# Patient Record
Sex: FEMALE | Race: WHITE | NOT HISPANIC OR LATINO | Employment: FULL TIME | URBAN - METROPOLITAN AREA
[De-identification: names, ages, dates, MRNs, and addresses within clinical notes are randomized per-mention and may not be internally consistent; named-entity substitution may affect disease eponyms.]

---

## 2017-01-04 ENCOUNTER — OFFICE VISIT (OUTPATIENT)
Dept: OBGYN | Facility: OTHER | Age: 52
End: 2017-01-04
Payer: COMMERCIAL

## 2017-01-04 VITALS
BODY MASS INDEX: 21.35 KG/M2 | DIASTOLIC BLOOD PRESSURE: 58 MMHG | SYSTOLIC BLOOD PRESSURE: 102 MMHG | HEART RATE: 64 BPM | WEIGHT: 125.5 LBS

## 2017-01-04 DIAGNOSIS — N89.8 VAGINAL ITCHING: Primary | ICD-10-CM

## 2017-01-04 LAB
MICRO REPORT STATUS: ABNORMAL
SPECIMEN SOURCE: ABNORMAL
WET PREP SPEC: ABNORMAL

## 2017-01-04 PROCEDURE — 87210 SMEAR WET MOUNT SALINE/INK: CPT | Performed by: ADVANCED PRACTICE MIDWIFE

## 2017-01-04 PROCEDURE — 99213 OFFICE O/P EST LOW 20 MIN: CPT | Performed by: ADVANCED PRACTICE MIDWIFE

## 2017-01-04 RX ORDER — FLUCONAZOLE 150 MG/1
150 TABLET ORAL
Qty: 3 TABLET | Refills: 1 | Status: SHIPPED | OUTPATIENT
Start: 2017-01-04 | End: 2017-11-14

## 2017-01-04 NOTE — NURSING NOTE
"Chief Complaint   Patient presents with     Vaginal Problem     check for yeast infection       Initial /58 mmHg  Pulse 64  Wt 125 lb 8 oz (56.926 kg)  LMP 12/24/2016 (Approximate) Estimated body mass index is 21.35 kg/(m^2) as calculated from the following:    Height as of 8/9/16: 5' 4.29\" (1.633 m).    Weight as of this encounter: 125 lb 8 oz (56.926 kg).  BP completed using cuff size: perla Chong CMA      "

## 2017-01-04 NOTE — PROGRESS NOTES
Brittany Corbett is a 51 year old who presents to the clinic for evaluation of vaginal changes      Vaginal Symptoms      Onset 1 week number of episodes of vaginitis in the past year 2    Description  itching    Intensity:  moderate    Accompanying signs and symptoms (fever/dysuria/abdominal or back pain): None    History  Sexually active: yes, single partner,   Currently pregnant: no  Possibility of pregnancy: No  Recent antibiotic use: no  Diabetes: no  Precipitating or alleviating factors: perhaps her period    Therapies tried and outcome: Monistat   Outcome: staying the same         Histories reviewed and updated  Past Medical History   Diagnosis Date     Psoriasis 9/23/2009     Past Surgical History   Procedure Laterality Date     C nonspecific procedure       laparoscopy for infertility     Hc tooth extraction w/forcep       Colonoscopy  7/21/2014     Procedure: COLONOSCOPY;  Surgeon: Duane, William Charles, MD;  Location: [facility] OR     Biopsy  June 2014     breast biopsy, right     Colonoscopy       Head & neck surgery       Social History     Social History     Marital Status:      Spouse Name: N/A     Number of Children: N/A     Years of Education: N/A     Occupational History     Not on file.     Social History Main Topics     Smoking status: Never Smoker      Smokeless tobacco: Never Used      Comment: no smokers in household     Alcohol Use: Yes      Comment: alcoholic beveragesx2/1x per month     Drug Use: No     Sexual Activity:     Partners: Male     Birth Control/ Protection: Surgical      Comment: vasectomy/infertility issues     Other Topics Concern     Parent/Sibling W/ Cabg, Mi Or Angioplasty Before 65f 55m? Yes     father at age 55 triple by pass     Social History Narrative     Family History   Problem Relation Age of Onset     DIABETES Mother      CANCER Mother      multiple myeloma     Hypertension Mother      C.A.D. Father      age 55 for CABG, had second bypass as well     Lipids Father       Depression Father      Cardiovascular Father      CANCER Paternal Grandmother      cervical     Neurologic Disorder Sister      MS     Breast Cancer Sister      11/2012     Breast Cancer Maternal Aunt      Breast Cancer Other      Cousin           CONSTITUTIONAL: Healthy, alert, in no apparent distress.  GI: NEGATIVE  : see above     EXAM:  /58 mmHg  Pulse 64  Wt 125 lb 8 oz (56.926 kg)  LMP 12/24/2016 (Approximate)  Patient appears well, vital signs normal.   Abdomen normal, soft without tenderness, guarding, mass or organomegaly.  No inguinal adenopathy or CVA tenderness.    : PELVIC EXAM:  Vulva: No external lesions, normal hair distribution, no adenopathy, BUS WNL  Vagina: Moist, pink, no abnormal discharge, well rugated, no lesions  Rectal exam: deferred    WET PREP: monilia       ASSESSMENT:   yeast.      PLAN:    Abstain from intercourse for duration of treatment.  Return if symptoms do not resolve as anticipated.

## 2017-01-04 NOTE — PATIENT INSTRUCTIONS
Here is a list of suggestions that may help eliminate or prevent vaginal infections or reduce their recurrence.  Many of these suggestions:   1. boosting your immune system  2. changing the vaginal environment to a more acidic state   3. increasing the good healthy bacteria    Read through them and try the ones that seem to fit you and your life style.    Soak in a warm bath tub, no soap, no bubble bath and no oils.  Add one cup vinegar or lemon juice to bath water once in a while,     Keep a water bottle with a squirt top in your bathroom, fill with warm water and use as a spray after wiping, then pat dry.  You may add 1-3 TBS of white vinegar to help maintain an acidic environment.    Wear cotton underwear; loose pants or skirts, avoid pantyhose and thong underwear.    Try to avoid wearing underware to bed so that your vaginal area can breathe and stay drier.    Keep vaginal area as dry as possible so don't sit for long periods of time in workout clothing or wet bathing suits.     Consider using either male or female condoms or asking your partner not to ejaculate in your vagina.    To boost your immune system consider the followin. Sleep:7-8 hours a night  2. Fluids: get a minimum of 8-10 glasses of water a day  3. Foods: try reducing processed foods in your diet and add whole grains, raw vegetables, fruit, and yogurt that contains active culture or kefir  4. Consider taking a vitamin B complex tablet, sometimes called stress tabs, Vitamin D 1000mg a day, or cranberry tablets.    5.  Consider the stress in your life and try to reduce it through yoga or meditation.    Decrease daily intake of refined sugars, honey, red meat and alcohol    At each meal drink 1tsp apple cider vinegar and 1 tsp honey in   cup warm water    Consider probiotic tablets to restore normal bacteria back into your system    Boric acid capsules, insert 2 capsules  00  daily into vagina every 3 days if you have chronic problems with  yeast or bacterial vaginosis.    If your symptoms do not resolve or if you have questions please call the clinic at [phone] for Jose Angel Hawley or [phone] for Radha or send a message through My Chart.

## 2017-02-06 ENCOUNTER — MYC MEDICAL ADVICE (OUTPATIENT)
Dept: FAMILY MEDICINE | Facility: OTHER | Age: 52
End: 2017-02-06

## 2017-02-06 DIAGNOSIS — N32.81 OVERACTIVE BLADDER: Primary | ICD-10-CM

## 2017-02-07 RX ORDER — TOLTERODINE 4 MG/1
4 CAPSULE, EXTENDED RELEASE ORAL DAILY
Qty: 90 CAPSULE | Refills: 1 | Status: SHIPPED | OUTPATIENT
Start: 2017-02-07 | End: 2017-06-16

## 2017-02-08 ENCOUNTER — MYC MEDICAL ADVICE (OUTPATIENT)
Dept: FAMILY MEDICINE | Facility: OTHER | Age: 52
End: 2017-02-08

## 2017-02-08 ENCOUNTER — TELEPHONE (OUTPATIENT)
Dept: FAMILY MEDICINE | Facility: OTHER | Age: 52
End: 2017-02-08

## 2017-02-08 DIAGNOSIS — N32.81 OVERACTIVE BLADDER: Primary | ICD-10-CM

## 2017-02-08 NOTE — TELEPHONE ENCOUNTER
[facility] Do you want us to try for a PA or change to something else.    No ins. or ID provided.

## 2017-02-08 NOTE — TELEPHONE ENCOUNTER
Left message for patient on voicemail to return call to clinic and also via my chart with new medication information.  Ginger Chong CMA

## 2017-02-10 RX ORDER — TOLTERODINE TARTRATE 2 MG/1
2 TABLET, EXTENDED RELEASE ORAL 2 TIMES DAILY
Qty: 60 TABLET | Refills: 11 | Status: SHIPPED | OUTPATIENT
Start: 2017-02-10 | End: 2018-02-19

## 2017-02-24 ENCOUNTER — MYC MEDICAL ADVICE (OUTPATIENT)
Dept: FAMILY MEDICINE | Facility: OTHER | Age: 52
End: 2017-02-24

## 2017-02-24 NOTE — TELEPHONE ENCOUNTER
Please advise medication should be 8-12 hours apart.    Kevan Sanchez PA-C  [facility]

## 2017-03-16 ENCOUNTER — TELEPHONE (OUTPATIENT)
Dept: FAMILY MEDICINE | Facility: OTHER | Age: 52
End: 2017-03-16

## 2017-03-16 NOTE — TELEPHONE ENCOUNTER
Brittany Corbett is a 52 year old female who calls with MILLIE symptoms.    NURSING ASSESSMENT:  Description: Patient has a dry cough, body aches, and low grade fever of 100.6 this morning.  Onset/duration:  Saturday  Precip. factors:  Works at a [employer], so possibility of exposure to influenza  Associated symptoms:  See above  Improves/worsens symptoms:  Nothing tried  Allergies:   Allergies   Allergen Reactions     No Known Drug Allergies        RECOMMENDED DISPOSITION:  Home care advice - Increase fluids to at least 64 oz, more if possible. Rest. Monitor for dehydration and worsening symptoms  Will comply with recommendation: YES   If further questions/concerns or if Sx do not improve, worsen or new Sx develop, call your PCP or [facility] as soon as possible.    NOTES:  Disposition was determined by the first positive assessment question, therefore all previous assessment questions were negative.     Guideline used:  Telephone Triage Protocols for Nurses, Fourth Edition, Chari Kline RN, BSN

## 2017-03-16 NOTE — TELEPHONE ENCOUNTER
Requested Provider:  ANY in ER or [facility]     PCP: Jeny Correia    Reason for visit: flu sx- fever & cough     Duration of symptoms: 4 days     Have you been treated for this in the past? No    Additional comments: Would like today. Please call.

## 2017-05-18 ENCOUNTER — TRANSFERRED RECORDS (OUTPATIENT)
Dept: HEALTH INFORMATION MANAGEMENT | Facility: CLINIC | Age: 52
End: 2017-05-18

## 2017-06-16 ENCOUNTER — OFFICE VISIT (OUTPATIENT)
Dept: FAMILY MEDICINE | Facility: OTHER | Age: 52
End: 2017-06-16
Payer: COMMERCIAL

## 2017-06-16 VITALS
DIASTOLIC BLOOD PRESSURE: 56 MMHG | WEIGHT: 125.4 LBS | OXYGEN SATURATION: 99 % | SYSTOLIC BLOOD PRESSURE: 98 MMHG | BODY MASS INDEX: 21.33 KG/M2 | RESPIRATION RATE: 16 BRPM | HEART RATE: 62 BPM | TEMPERATURE: 97.9 F

## 2017-06-16 DIAGNOSIS — H10.33 ACUTE BACTERIAL CONJUNCTIVITIS OF BOTH EYES: ICD-10-CM

## 2017-06-16 DIAGNOSIS — R05.9 COUGH: ICD-10-CM

## 2017-06-16 DIAGNOSIS — J01.90 ACUTE SINUSITIS WITH SYMPTOMS > 10 DAYS: Primary | ICD-10-CM

## 2017-06-16 PROCEDURE — 99213 OFFICE O/P EST LOW 20 MIN: CPT | Performed by: PHYSICIAN ASSISTANT

## 2017-06-16 RX ORDER — POLYMYXIN B SULFATE AND TRIMETHOPRIM 1; 10000 MG/ML; [USP'U]/ML
1 SOLUTION OPHTHALMIC EVERY 4 HOURS
Qty: 1 BOTTLE | Refills: 0 | Status: SHIPPED | OUTPATIENT
Start: 2017-06-16 | End: 2017-06-23

## 2017-06-16 RX ORDER — CODEINE PHOSPHATE AND GUAIFENESIN 10; 100 MG/5ML; MG/5ML
1 SOLUTION ORAL EVERY 4 HOURS PRN
Qty: 120 ML | Refills: 0 | Status: SHIPPED | OUTPATIENT
Start: 2017-06-16 | End: 2017-12-19

## 2017-06-16 ASSESSMENT — PAIN SCALES - GENERAL: PAINLEVEL: NO PAIN (0)

## 2017-06-16 NOTE — PROGRESS NOTES
SUBJECTIVE:                                                    Brittany Corbett is a 52 year old female who presents to clinic today for the following health issues:      HPI    Acute Illness   Acute illness concerns: cough in the chest  Onset: a week ago     Fever: no     Chills/Sweats: no     Headache (location?): YES- dull    Sinus Pressure:YES    Conjunctivitis:  YES: both    Ear Pain: YES- no pain but feel full    Rhinorrhea: YES    Congestion: YES    Sore Throat: no      Cough: YES    Wheeze: no     Decreased Appetite: YES    Nausea: no     Vomiting: no     Diarrhea:  no     Dysuria/Freq.: no     Fatigue/Achiness: YES    Sick/Strep Exposure: no      Therapies Tried and outcome: robitussin with codeien     Eye(s) Problem     Onset: a week ago     Description:   Location: both   Pain: YES   Redness: YES    Accompanying Signs & Symptoms:  Discharge/mattering: YES  Swelling: no- but feels like it   Visual changes: no   Fever: no   Nasal Congestion: YES  Bothered by bright lights: YES     History:   Trauma: no   Foreign body exposure: no     Precipitating factors:   Wearing contacts: no     Alleviating factors:  Improved by: warm wash cloth        Therapies Tried and outcome:       Problem list and histories reviewed & adjusted, as indicated.  Additional history: as documented    ROS:  Constitutional, HEENT, cardiovascular, pulmonary, gi and gu systems are negative, except as otherwise noted.    OBJECTIVE:                                                    BP 98/56  Pulse 62  Temp 97.9  F (36.6  C) (Oral)  Resp 16  Wt 125 lb 6.4 oz (56.9 kg)  SpO2 99%  BMI 21.33 kg/m2  Body mass index is 21.33 kg/(m^2).  GENERAL APPEARANCE: ill appearing, alert and no distress  EYES: PERRLA, EOMI, conjunctivae and sclerae with mild injection and yellow discharge  HENT: Bilateral ear canals without erythema or cerumen, bilateral TM's pearly grey with normal light reflex, bilateral clear serous effusion with no injection or  bulging, nasal turbinates with severe swelling & erythema, yellow nasal discharge, mouth without ulcers or lesions, oropharynx erythematous without edema or exudate, oral mucous membranes moist, bilateral sinus tenderness in maxillary and frontal sinuses  NECK: Bilateral anterior cervical adenopathy, no adenopathy in posterior cervical or supraclavicular regions  RESP: Lungs clear to auscultation - no rales, rhonchi or wheezes   CV: Regular rates and rhythm, normal S1 S2, no S3 or S4, no murmur, click or rub  MS: No musculoskeletal defects are noted and gait is age appropriate without ataxia   SKIN: No suspicious lesions or rashes, hydration status appears adeuqate with normal skin turgor   PSYCH: Alert and oriented x3; speech- coherent , normal rate and volume; able to articulate logical thoughts, able to abstract reason, no tangential thoughts, no hallucinations or delusions, mentation appears normal, Mood is euthymic. Affect is appropriate for this mood state and bright. Thought content is free of suicidal ideation, hallucinations, and delusions. Dress is adequate and upkept. Eye contact is good during conversation.       Diagnostic Test Results:  none      ASSESSMENT/PLAN:                                                        ICD-10-CM    1. Acute sinusitis with symptoms > 10 days J01.90 amoxicillin-clavulanate (AUGMENTIN) 875-125 MG per tablet   2. Acute bacterial conjunctivitis of both eyes H10.33 trimethoprim-polymyxin b (POLYTRIM) ophthalmic solution   3. Cough R05 guaiFENesin-codeine (ROBITUSSIN AC) 100-10 MG/5ML SOLN solution     - Discussed antibiotic use, duration, and side effects  - Discussed need for eye drops as well  - Discussed cough medication use, duration, and side effects   - Rest and fluids  - Hand outs given    The patient indicates understanding of these issues and agrees with the plan.    Follow up: PRN         Camila Parr-JOY Sims  [facility]

## 2017-06-16 NOTE — PATIENT INSTRUCTIONS
Sinusitis           What is sinusitis?   Sinusitis is swollen, infected linings of the sinuses. The sinuses are hollow spaces in the bones of your face and skull. They connect with the nose through small openings. Like the nose, their linings make mucus.   How does it occur?   Sinusitis occurs when the sinus linings become infected. The passageways from the sinuses to the nose are very narrow. Swelling and mucus may block the passageways. This leads to pressure changes in the sinuses that can be painful.   A number of things can cause swelling and sinusitis. Most often it's allergens (things that cause allergies, like pollen and mold) and viruses, such as viruses that cause the common cold. Whether the cause is allergies or a virus, the sinus linings can swell. When swelling causes the sinus passageway to swell shut, bacteria, viruses, and even fungus can be trapped in the sinuses and cause a sinus infection.   If your nasal bones have been injured or are deformed, causing partial blockage of the sinus openings, you are more likely to get sinusitis.   What are the symptoms?   Symptoms include:   feeling of fullness or pressure in your head   a headache that is most painful when you first wake up in the morning or when you bend your head down or forward   pain above or below your eyes   aching in the upper jaw and teeth   runny or stuffy nose   cough, especially at night   fluid draining down the back of your throat (postnasal drainage)   sore throat in the morning or evening.   How is it diagnosed?   Your healthcare provider will ask about your symptoms and will examine you. You may have an X-ray to look for swelling, fluid, or small benign growths (polyps) in the sinuses.   How is it treated?   Decongestants may help. They may be nonprescription or prescription. They are available as liquids, pills, and nose sprays.   Your healthcare provider may prescribe an antibiotic. In some cases you may need to  take decongestants and antibiotics for several weeks.   You may need nonprescription medicine for pain, such as acetaminophen or ibuprofen. Check with your healthcare provider before you give any medicine that contains aspirin or salicylates to a child or teen. This includes medicines like baby aspirin, some cold medicines, and Pepto Bismol. Children and teens who take aspirin are at risk for a serious illness called Reye's syndrome. Ibuprofen is an NSAID. Nonsteroidal anti-inflammatory medicines (NSAIDs) may cause stomach bleeding and other problems. These risks increase with age. Read the label and take as directed. Unless recommended by your healthcare provider, do not take NSAIDs for more than 10 days for any reason.   If you have chronic or repeated sinus infections, allergies may be the cause. Your healthcare provider may prescribe antihistamine tablets or prescription nasal sprays (steroids or cromolyn) to treat the allergies.   If you have chronic, severe sinusitis that does not respond to treatment with medicines, surgery may be done. The surgeon can create an extra or enlarged passageway in the wall of the sinus cavity. This allows the sinuses to drain more easily through the nasal passages. This should help them stay free of infection.   How long will the effects last?   Symptoms may get better gradually over 3 to 10 days. Depending on what caused the sinusitis and how severe it is, it may last for days or weeks. The symptoms may come back if you do not finish all of your antibiotic.   How can I take care of myself?   Follow your healthcare provider's instructions.   If you are taking an antibiotic, take all of it as directed by your provider. If you stop taking the medicine when your symptoms are gone but before you have taken all of the medicine, symptoms may come back.   Avoid tobacco smoke.   If you have allergies, take care to avoid the things you are allergic to, such as animal dander.   Add  moisture to the air with a humidifier or a vaporizer, unless you have mold allergy (mold may grow in your vaporizer).   Inhale steam from a basin of hot water or shower to help open your sinuses and relieve pain.   Use saline nasal sprays to help wash out nasal passages and clear some mucus from the airways.   Use decongestants as directed on the label or by your provider.   If you are using a nonprescription nasal-spray decongestant, generally you should not use it for more than 3 days. After 3 days it may cause your symptoms to get worse. Ask your healthcare provider if it is OK for you to use a nasal spray decongestant longer than this.   Get plenty of rest.   Drink more fluids to keep the mucus as thin as possible so your sinuses can drain more easily.   Put warm compresses on painful areas.   Take antibiotics as prescribed. Use all of the medicine, even after you feel better. Some sinus infections require 2 to 4 weeks of antibiotic treatment.   See your healthcare provider if the pain lasts for several days or gets worse.   If the sinus areas above or below your eyes are swollen or bulging, see your healthcare provider right away. This symptom may mean that the infection is spreading. A spreading infection can affect other parts of your body--even the brain--and needs to be treated promptly.   How can I help prevent sinusitis?   Treat your colds and allergies promptly. Use decongestants as soon as you start having symptoms.   Do not smoke and stay away from secondhand smoke.   Drink lots of fluids to keep the mucus thin.   Humidify your home if the air is particularly dry.   If you have sinus infections often, consider having allergy tests.   If sinusitis continues to be a problem despite treatment, you might need an exam by an ear, nose, and throat doctor (called an ENT or otolaryngologist). The specialist will check for polyps or a deformed bone that may be blocking your sinuses.     Published by  [employer].  This content is reviewed periodically and is subject to change as new health information becomes available. The information is intended to inform and educate and is not a replacement for medical evaluation, advice, diagnosis or treatment by a healthcare professional.   Developed by [employer].   ? 2010 [facility] and/or its affiliates. All Rights Reserved.   Copyright   [employer] 2011  Adult [employer]visor                      Conjunctivitis  What is eye inflammation?   The clear membrane that lines the inside of the eyelids and covers the white of the eye (conjunctiva) can get red and swollen. This is called conjunctivitis.   How does it occur?   Conjunctivitis can be caused by many things, including infection by viruses or bacteria. Many kinds of bacteria can cause conjunctivitis. These include bacteria that cause strep, staph, and STD infections.   Conjunctivitis caused by a virus is sometimes called pink eye. It can be spread easily to other people. The same viruses that cause the common cold can cause viral conjunctivitis. Viruses can be spread by coughing or sneezing and can get in your eyes through contact with infected:  hands   washcloths or towels   cosmetics   false eyelashes   soft contact lenses  Avoid unnecessary contact with others so that you do not spread the disease.   What are the symptoms?   Symptoms may include:  itchy or scratchy eyes   redness   painful sensitivity to light   swelling of eyelids   matting of eyelashes   watery or pus discharge  How is it diagnosed?   Your healthcare provider will ask about your medical history and if you have been near someone who has conjunctivitis. Your provider will examine your eyes. He or she will also check for enlarged lymph nodes near your ear and jaw. Your provider may get lab tests of a sample of the pus to see what type of germs are present.  How is it treated?   Like a cold, viral conjunctivitis will usually go away  on its own without treatment. However, your healthcare provider may prescribe eyedrops to help control your symptoms. Antihistamine pills may also relieve the itching and redness.  If you have bacterial conjunctivitis, your healthcare provider will prescribe antibiotic eyedrops. You can also help your eyes get better by washing them gently to remove any pus or crusts. Then dry them gently with a clean towel.  For very severe forms of conjunctivitis, antibiotics may need to be given by mouth or with a shot or an IV.  If you wear contact lenses, you will need to stop wearing them until your eyes are healed. The combination of contacts and conjunctivitis may damage your cornea (the clear outer layer on the front of your eye) and cause severe vision problems. Your provider may ask you to throw away your current contact lenses and lens case.  How long will the effects last?   Viral conjunctivitis usually gets worse 5 to 7 days after the first symptoms. It can get better in 10 days to 1 month. If only one eye is affected at first, the other eye may become infected up to 2 weeks later. Usually, if both eyes are affected, the first eye has worse conjunctivitis than the second.  Bacterial conjunctivitis should improve within 2 days after you begin using antibiotics. If your eyes are not better after 3 days of antibiotics, call your healthcare provider.  How can I prevent conjunctivitis?   To keep from getting conjunctivitis from someone who has it, or to keep from spreading it to others, follow these guidelines:  Wash your hands often. Do not touch or rub your eyes.   Never share eye makeup or cosmetics with anyone. When you have conjunctivitis, throw out eye makeup you have been using.   Never use eye medicine that has been prescribed for someone else.   Do not share towels, washcloths, pillows, or sheets with anyone. If one of your eyes is affected but not the other, use a separate towel for each eye.   Avoid swimming in  swimming pools if you have conjunctivitis.   Avoid close contact with people until your symptoms improve. Depending on your job, you may be asked to take some time off from work.  When should I call my healthcare provider?   Call your provider if:  You have any severe eye pain.   Your symptoms do not improve after you have used your medicine for 3 days (if you have bacterial conjunctivitis).   Your symptoms do not improve after 2 weeks (if you have viral conjunctivitis).   Your eyes get very sensitive to light, even after the redness is gone.  Reviewed for medical accuracy by faculty at the [facility] at [facility]. Web site: [email or web address]

## 2017-06-16 NOTE — MR AVS SNAPSHOT
After Visit Summary   6/16/2017    Brittany Corbett    MRN: [id number]           Patient Information     Date Of Birth          1965        Visit Information        Provider Department      6/16/2017 12:15 PM Camila Sanchez PA-C [facility]        Today's Diagnoses     Acute sinusitis with symptoms > 10 days    -  1    Acute bacterial conjunctivitis of both eyes        Cough          Care Instructions                  Sinusitis           What is sinusitis?   Sinusitis is swollen, infected linings of the sinuses. The sinuses are hollow spaces in the bones of your face and skull. They connect with the nose through small openings. Like the nose, their linings make mucus.   How does it occur?   Sinusitis occurs when the sinus linings become infected. The passageways from the sinuses to the nose are very narrow. Swelling and mucus may block the passageways. This leads to pressure changes in the sinuses that can be painful.   A number of things can cause swelling and sinusitis. Most often it's allergens (things that cause allergies, like pollen and mold) and viruses, such as viruses that cause the common cold. Whether the cause is allergies or a virus, the sinus linings can swell. When swelling causes the sinus passageway to swell shut, bacteria, viruses, and even fungus can be trapped in the sinuses and cause a sinus infection.   If your nasal bones have been injured or are deformed, causing partial blockage of the sinus openings, you are more likely to get sinusitis.   What are the symptoms?   Symptoms include:   feeling of fullness or pressure in your head   a headache that is most painful when you first wake up in the morning or when you bend your head down or forward   pain above or below your eyes   aching in the upper jaw and teeth   runny or stuffy nose   cough, especially at night   fluid draining down the back of your throat (postnasal drainage)   sore throat in  the morning or evening.   How is it diagnosed?   Your healthcare provider will ask about your symptoms and will examine you. You may have an X-ray to look for swelling, fluid, or small benign growths (polyps) in the sinuses.   How is it treated?   Decongestants may help. They may be nonprescription or prescription. They are available as liquids, pills, and nose sprays.   Your healthcare provider may prescribe an antibiotic. In some cases you may need to take decongestants and antibiotics for several weeks.   You may need nonprescription medicine for pain, such as acetaminophen or ibuprofen. Check with your healthcare provider before you give any medicine that contains aspirin or salicylates to a child or teen. This includes medicines like baby aspirin, some cold medicines, and Pepto Bismol. Children and teens who take aspirin are at risk for a serious illness called Reye's syndrome. Ibuprofen is an NSAID. Nonsteroidal anti-inflammatory medicines (NSAIDs) may cause stomach bleeding and other problems. These risks increase with age. Read the label and take as directed. Unless recommended by your healthcare provider, do not take NSAIDs for more than 10 days for any reason.   If you have chronic or repeated sinus infections, allergies may be the cause. Your healthcare provider may prescribe antihistamine tablets or prescription nasal sprays (steroids or cromolyn) to treat the allergies.   If you have chronic, severe sinusitis that does not respond to treatment with medicines, surgery may be done. The surgeon can create an extra or enlarged passageway in the wall of the sinus cavity. This allows the sinuses to drain more easily through the nasal passages. This should help them stay free of infection.   How long will the effects last?   Symptoms may get better gradually over 3 to 10 days. Depending on what caused the sinusitis and how severe it is, it may last for days or weeks. The symptoms may come back if you do not  finish all of your antibiotic.   How can I take care of myself?   Follow your healthcare provider's instructions.   If you are taking an antibiotic, take all of it as directed by your provider. If you stop taking the medicine when your symptoms are gone but before you have taken all of the medicine, symptoms may come back.   Avoid tobacco smoke.   If you have allergies, take care to avoid the things you are allergic to, such as animal dander.   Add moisture to the air with a humidifier or a vaporizer, unless you have mold allergy (mold may grow in your vaporizer).   Inhale steam from a basin of hot water or shower to help open your sinuses and relieve pain.   Use saline nasal sprays to help wash out nasal passages and clear some mucus from the airways.   Use decongestants as directed on the label or by your provider.   If you are using a nonprescription nasal-spray decongestant, generally you should not use it for more than 3 days. After 3 days it may cause your symptoms to get worse. Ask your healthcare provider if it is OK for you to use a nasal spray decongestant longer than this.   Get plenty of rest.   Drink more fluids to keep the mucus as thin as possible so your sinuses can drain more easily.   Put warm compresses on painful areas.   Take antibiotics as prescribed. Use all of the medicine, even after you feel better. Some sinus infections require 2 to 4 weeks of antibiotic treatment.   See your healthcare provider if the pain lasts for several days or gets worse.   If the sinus areas above or below your eyes are swollen or bulging, see your healthcare provider right away. This symptom may mean that the infection is spreading. A spreading infection can affect other parts of your body--even the brain--and needs to be treated promptly.   How can I help prevent sinusitis?   Treat your colds and allergies promptly. Use decongestants as soon as you start having symptoms.   Do not smoke and stay away from  secondhand smoke.   Drink lots of fluids to keep the mucus thin.   Humidify your home if the air is particularly dry.   If you have sinus infections often, consider having allergy tests.   If sinusitis continues to be a problem despite treatment, you might need an exam by an ear, nose, and throat doctor (called an ENT or otolaryngologist). The specialist will check for polyps or a deformed bone that may be blocking your sinuses.     Published by [employer].  This content is reviewed periodically and is subject to change as new health information becomes available. The information is intended to inform and educate and is not a replacement for medical evaluation, advice, diagnosis or treatment by a healthcare professional.   Developed by [employer].   ? 2010 [employer] and/or its affiliates. All Rights Reserved.   Copyright   [employer] 2011  Adult Health Advisor                      Conjunctivitis  What is eye inflammation?   The clear membrane that lines the inside of the eyelids and covers the white of the eye (conjunctiva) can get red and swollen. This is called conjunctivitis.   How does it occur?   Conjunctivitis can be caused by many things, including infection by viruses or bacteria. Many kinds of bacteria can cause conjunctivitis. These include bacteria that cause strep, staph, and STD infections.   Conjunctivitis caused by a virus is sometimes called pink eye. It can be spread easily to other people. The same viruses that cause the common cold can cause viral conjunctivitis. Viruses can be spread by coughing or sneezing and can get in your eyes through contact with infected:  hands   washcloths or towels   cosmetics   false eyelashes   soft contact lenses  Avoid unnecessary contact with others so that you do not spread the disease.   What are the symptoms?   Symptoms may include:  itchy or scratchy eyes   redness   painful sensitivity to light   swelling of eyelids   matting of eyelashes    watery or pus discharge  How is it diagnosed?   Your healthcare provider will ask about your medical history and if you have been near someone who has conjunctivitis. Your provider will examine your eyes. He or she will also check for enlarged lymph nodes near your ear and jaw. Your provider may get lab tests of a sample of the pus to see what type of germs are present.  How is it treated?   Like a cold, viral conjunctivitis will usually go away on its own without treatment. However, your healthcare provider may prescribe eyedrops to help control your symptoms. Antihistamine pills may also relieve the itching and redness.  If you have bacterial conjunctivitis, your healthcare provider will prescribe antibiotic eyedrops. You can also help your eyes get better by washing them gently to remove any pus or crusts. Then dry them gently with a clean towel.  For very severe forms of conjunctivitis, antibiotics may need to be given by mouth or with a shot or an IV.  If you wear contact lenses, you will need to stop wearing them until your eyes are healed. The combination of contacts and conjunctivitis may damage your cornea (the clear outer layer on the front of your eye) and cause severe vision problems. Your provider may ask you to throw away your current contact lenses and lens case.  How long will the effects last?   Viral conjunctivitis usually gets worse 5 to 7 days after the first symptoms. It can get better in 10 days to 1 month. If only one eye is affected at first, the other eye may become infected up to 2 weeks later. Usually, if both eyes are affected, the first eye has worse conjunctivitis than the second.  Bacterial conjunctivitis should improve within 2 days after you begin using antibiotics. If your eyes are not better after 3 days of antibiotics, call your healthcare provider.  How can I prevent conjunctivitis?   To keep from getting conjunctivitis from someone who has it, or to keep from spreading it to  others, follow these guidelines:  Wash your hands often. Do not touch or rub your eyes.   Never share eye makeup or cosmetics with anyone. When you have conjunctivitis, throw out eye makeup you have been using.   Never use eye medicine that has been prescribed for someone else.   Do not share towels, washcloths, pillows, or sheets with anyone. If one of your eyes is affected but not the other, use a separate towel for each eye.   Avoid swimming in swimming pools if you have conjunctivitis.   Avoid close contact with people until your symptoms improve. Depending on your job, you may be asked to take some time off from work.  When should I call my healthcare provider?   Call your provider if:  You have any severe eye pain.   Your symptoms do not improve after you have used your medicine for 3 days (if you have bacterial conjunctivitis).   Your symptoms do not improve after 2 weeks (if you have viral conjunctivitis).   Your eyes get very sensitive to light, even after the redness is gone.  Reviewed for medical accuracy by faculty at the [facility]. Web site: [email or web address]              Follow-ups after your visit        Who to contact     If you have questions or need follow up information about today's clinic visit or your schedule please contact [facility] directly at [phone].  Normal or non-critical lab and imaging results will be communicated to you by MyChart, letter or phone within 4 business days after the clinic has received the results. If you do not hear from us within 7 days, please contact the clinic through MyChart or phone. If you have a critical or abnormal lab result, we will notify you by phone as soon as possible.  Submit refill requests through [employer] or call your pharmacy and they will forward the refill request to us. Please allow 3 business days for your refill to be completed.          Additional Information About Your  Visit        [facility] Information     [employer] gives you secure access to your electronic health record. If you see a primary care provider, you can also send messages to your care team and make appointments. If you have questions, please call your primary care clinic.  If you do not have a primary care provider, please call [phone] and they will assist you.        Care EveryWhere ID     This is your Care EveryWhere ID. This could be used by other organizations to access your [facility] medical records  [id number]        Your Vitals Were     Pulse Temperature Respirations Pulse Oximetry BMI (Body Mass Index)       62 97.9  F (36.6  C) (Oral) 16 99% 21.33 kg/m2        Blood Pressure from Last 3 Encounters:   06/16/17 98/56   01/04/17 102/58   08/31/16 90/60    Weight from Last 3 Encounters:   06/16/17 125 lb 6.4 oz (56.9 kg)   01/04/17 125 lb 8 oz (56.9 kg)   08/31/16 120 lb 4 oz (54.5 kg)              Today, you had the following     No orders found for display         Today's Medication Changes          These changes are accurate as of: 6/16/17 12:32 PM.  If you have any questions, ask your nurse or doctor.               Start taking these medicines.        Dose/Directions    amoxicillin-clavulanate 875-125 MG per tablet   Commonly known as:  AUGMENTIN   Used for:  Acute sinusitis with symptoms > 10 days        Dose:  1 tablet   Take 1 tablet by mouth 2 times daily   Quantity:  20 tablet   Refills:  0       guaiFENesin-codeine 100-10 MG/5ML Soln solution   Commonly known as:  ROBITUSSIN AC   Used for:  Cough        Dose:  1 tsp.   Take 5 mLs by mouth every 4 hours as needed for cough   Quantity:  120 mL   Refills:  0       trimethoprim-polymyxin b ophthalmic solution   Commonly known as:  POLYTRIM   Used for:  Acute bacterial conjunctivitis of both eyes        Dose:  1 drop   Apply 1 drop to eye every 4 hours for 7 days   Quantity:  1 Bottle   Refills:  0            Where to get your medicines      These  medications were sent to [id number] - [place], [place] - [address]  [address], [facility] [address]    Hours:  test Rx sent successfully 12/26/02  [facility] Phone:  [phone]     amoxicillin-clavulanate 875-125 MG per tablet    trimethoprim-polymyxin b ophthalmic solution         Some of these will need a paper prescription and others can be bought over the counter.  Ask your nurse if you have questions.     Bring a paper prescription for each of these medications     guaiFENesin-codeine 100-10 MG/5ML Soln solution                Primary Care Provider Office Phone # Fax #    Jeny COMPA Correia [phone]       [facility] [address]  [facility] [address]        Thank you!     Thank you for choosing [facility]  for your care. Our goal is always to provide you with excellent care. Hearing back from our patients is one way we can continue to improve our services. Please take a few minutes to complete the written survey that you may receive in the mail after your visit with us. Thank you!             Your Updated Medication List - Protect others around you: Learn how to safely use, store and throw away your medicines at www.disposemymeds.org.          This list is accurate as of: 6/16/17 12:32 PM.  Always use your most recent med list.                   Brand Name Dispense Instructions for use    amoxicillin-clavulanate 875-125 MG per tablet    AUGMENTIN    20 tablet    Take 1 tablet by mouth 2 times daily       clobetasol 0.05 % cream    TEMOVATE    60 g    Apply topically as needed On elbows       fluconazole 150 MG tablet    DIFLUCAN    3 tablet    Take 1 tablet (150 mg) by mouth every 3 days       fluocinonide 0.05 % solution    LIDEX    60 mL    Apply topically daily On scalp       fluticasone 50 MCG/ACT spray    FLONASE    1 Package    Spray 2 sprays into both nostrils daily       guaiFENesin-codeine 100-10 MG/5ML Soln solution     ROBITUSSIN AC    120 mL    Take 5 mLs by mouth every 4 hours as needed for cough       tolterodine 2 MG tablet    DETROL    60 tablet    Take 1 tablet (2 mg) by mouth 2 times daily       trimethoprim-polymyxin b ophthalmic solution    POLYTRIM    1 Bottle    Apply 1 drop to eye every 4 hours for 7 days

## 2017-06-16 NOTE — NURSING NOTE
"Chief Complaint   Patient presents with     Conjunctivitis     Upper respiratory illness      Cough     x1week        Initial There were no vitals taken for this visit. Estimated body mass index is 21.35 kg/(m^2) as calculated from the following:    Height as of 8/9/16: 5' 4.29\" (1.633 m).    Weight as of 1/4/17: 125 lb 8 oz (56.9 kg).  Medication Reconciliation: complete    "

## 2017-07-11 ENCOUNTER — OFFICE VISIT (OUTPATIENT)
Dept: FAMILY MEDICINE | Facility: OTHER | Age: 52
End: 2017-07-11
Payer: COMMERCIAL

## 2017-07-11 VITALS
WEIGHT: 121 LBS | BODY MASS INDEX: 20.58 KG/M2 | HEART RATE: 67 BPM | SYSTOLIC BLOOD PRESSURE: 96 MMHG | DIASTOLIC BLOOD PRESSURE: 60 MMHG | RESPIRATION RATE: 12 BRPM | OXYGEN SATURATION: 100 % | TEMPERATURE: 97.4 F

## 2017-07-11 DIAGNOSIS — J01.90 ACUTE SINUSITIS TREATED WITH ANTIBIOTICS IN THE PAST 60 DAYS: Primary | ICD-10-CM

## 2017-07-11 LAB
ERYTHROCYTE [DISTWIDTH] IN BLOOD BY AUTOMATED COUNT: 13.1 % (ref 10–15)
HCT VFR BLD AUTO: 43.3 % (ref 35–47)
HGB BLD-MCNC: 14.7 G/DL (ref 11.7–15.7)
MCH RBC QN AUTO: 31.2 PG (ref 26.5–33)
MCHC RBC AUTO-ENTMCNC: 33.9 G/DL (ref 31.5–36.5)
MCV RBC AUTO: 92 FL (ref 78–100)
PLATELET # BLD AUTO: 185 10E9/L (ref 150–450)
RBC # BLD AUTO: 4.71 10E12/L (ref 3.8–5.2)
WBC # BLD AUTO: 6.1 10E9/L (ref 4–11)

## 2017-07-11 PROCEDURE — 99213 OFFICE O/P EST LOW 20 MIN: CPT | Performed by: FAMILY MEDICINE

## 2017-07-11 PROCEDURE — 36415 COLL VENOUS BLD VENIPUNCTURE: CPT | Performed by: FAMILY MEDICINE

## 2017-07-11 PROCEDURE — 85027 COMPLETE CBC AUTOMATED: CPT | Performed by: FAMILY MEDICINE

## 2017-07-11 RX ORDER — METHYLPREDNISOLONE 4 MG
TABLET, DOSE PACK ORAL
Qty: 21 TABLET | Refills: 0 | Status: SHIPPED | OUTPATIENT
Start: 2017-07-11 | End: 2017-12-19

## 2017-07-11 RX ORDER — SULFAMETHOXAZOLE/TRIMETHOPRIM 800-160 MG
1 TABLET ORAL 2 TIMES DAILY
Qty: 20 TABLET | Refills: 0 | Status: SHIPPED | OUTPATIENT
Start: 2017-07-11 | End: 2017-12-19

## 2017-07-11 ASSESSMENT — PAIN SCALES - GENERAL: PAINLEVEL: MILD PAIN (3)

## 2017-07-11 NOTE — PROGRESS NOTES
SUBJECTIVE:                                                    Brittany Corbett is a 52 year old female who presents to clinic today for the following health issues:    HPI    Acute Illness   Acute illness concerns: sinus infection  Onset: 1 month    Fever: no    Chills/Sweats: no    Headache (location?): YES- dull    Sinus Pressure:YES    Conjunctivitis:  no    Ear Pain: YES- plugged    Rhinorrhea: no    Congestion: YES    Sore Throat: YES     Cough: YES-non-productive    Wheeze: no    Decreased Appetite: YES    Nausea: no    Vomiting: no    Diarrhea:  no    Dysuria/Freq.: no    Fatigue/Achiness: no    Sick/Strep Exposure: no     Therapies Tried and outcome: was seen by [facility] and treated with Augmentin, did not get better.  Felt her eyes got better (stated she had pink eye), but the congestion did not.  Sister just diagnosed with congenital bronchial atresia at age 58, manifested as severe cough.  Patient's daughter has severe allergies with recurrent sinus infections.  Patient admits that she has spring allergies, but feels these have resolved.    Problem list and histories reviewed & adjusted, as indicated.  Additional history: as documented    Patient Active Problem List   Diagnosis     Premature beats     Other chronic allergic conjunctivitis     Psoriasis     Bulging discs     Past Surgical History:   Procedure Laterality Date     BIOPSY  June 2014    breast biopsy, right     C NONSPECIFIC PROCEDURE      laparoscopy for infertility     COLONOSCOPY  7/21/2014    Procedure: COLONOSCOPY;  Surgeon: Duane, William Charles, MD;  Location: [facility] OR     COLONOSCOPY       HC TOOTH EXTRACTION W/FORCEP       HEAD & NECK SURGERY         Social History   Substance Use Topics     Smoking status: Never Smoker     Smokeless tobacco: Never Used      Comment: no smokers in household     Alcohol use Yes      Comment: alcoholic beveragesx2/1x per month     Family History   Problem Relation Age of Onset     DIABETES Mother       CANCER Mother      multiple myeloma     Hypertension Mother      C.A.D. Father      age 55 for CABG, had second bypass as well     Lipids Father      Depression Father      Cardiovascular Father      CANCER Paternal Grandmother      cervical     Neurologic Disorder Sister      MS     Breast Cancer Sister      11/2012     Breast Cancer Maternal Aunt      Breast Cancer Other      Cousin           ROS:  R: NEGATIVE for significant cough or SOB  CV: NEGATIVE for chest pain, palpitations or peripheral edema    OBJECTIVE:     BP 96/60 (BP Location: Left arm, Patient Position: Chair, Cuff Size: Adult Regular)  Pulse 67  Temp 97.4  F (36.3  C) (Temporal)  Resp 12  Wt 121 lb (54.9 kg)  SpO2 100%  BMI 20.58 kg/m2  Body mass index is 20.58 kg/(m^2).  Gen: no apparent distress  HENT: Ears normal. Nose pale and congested. Throat and pharynx normal. Neck supple. No adenopathy or masses in the neck or supraclavicular regions. Maxillary sinuses tender to palpatation.   NECK: no adenopathy, no asymmetry, no masses  Chest: clear to auscultation without wheeze, rale or rhonchi  Cor: regular rate and rhythm without murmur  Psych: Alert and oriented times 3; coherent speech, normal   rate and volume, able to articulate logical thoughts, able   to abstract reason, no tangential thoughts, no hallucinations   or delusions  Her affect is neutral.    ASSESSMENT/PLAN:     1. Acute sinusitis treated with antibiotics in the past 60 days  Patient would like more diagnostics, agreed on CBC.  Nose appears more allergic, will treat with steroids and cover for infection with Bactrim.  If this doesn't help, she will let me know and then would consider sinus CT and ENT consult.    - CBC with platelets  - methylPREDNISolone (MEDROL DOSEPAK) 4 MG tablet; Follow package instructions  Dispense: 21 tablet; Refill: 0  - sulfamethoxazole-trimethoprim (BACTRIM DS/SEPTRA DS) 800-160 MG per tablet; Take 1 tablet by mouth 2 times daily  Dispense: 20  tablet; Refill: 0    Jeny Correia MD  [facility]

## 2017-07-11 NOTE — MR AVS SNAPSHOT
After Visit Summary   7/11/2017    Brittany Corbett    MRN: [id number]           Patient Information     Date Of Birth          1965        Visit Information        Provider Department      7/11/2017 10:20 AM Jeny Correia MD [facility]        Today's Diagnoses     Acute sinusitis treated with antibiotics in the past 60 days    -  1       Follow-ups after your visit        Who to contact     If you have questions or need follow up information about today's clinic visit or your schedule please contact [facility] directly at [phone].  Normal or non-critical lab and imaging results will be communicated to you by Shipping Easyhart, letter or phone within 4 business days after the clinic has received the results. If you do not hear from us within 7 days, please contact the clinic through [employer] Easy[employer] or phone. If you have a critical or abnormal lab result, we will notify you by phone as soon as possible.  Submit refill requests through [employer] or call your pharmacy and they will forward the refill request to us. Please allow 3 business days for your refill to be completed.          Additional Information About Your Visit        MyChart Information     [employer] gives you secure access to your electronic health record. If you see a primary care provider, you can also send messages to your care team and make appointments. If you have questions, please call your primary care clinic.  If you do not have a primary care provider, please call [phone] and they will assist you.        Care EveryWhere ID     This is your Care EveryWhere ID. This could be used by other organizations to access your [place] medical records  [id number]        Your Vitals Were     Pulse Temperature Respirations Pulse Oximetry BMI (Body Mass Index)       67 97.4  F (36.3  C) (Temporal) 12 100% 20.58 kg/m2        Blood Pressure from Last 3 Encounters:   07/11/17 96/60   06/16/17 98/56   01/04/17  102/58    Weight from Last 3 Encounters:   07/11/17 121 lb (54.9 kg)   06/16/17 125 lb 6.4 oz (56.9 kg)   01/04/17 125 lb 8 oz (56.9 kg)              We Performed the Following     CBC with platelets          Today's Medication Changes          These changes are accurate as of: 7/11/17 10:53 AM.  If you have any questions, ask your nurse or doctor.               Start taking these medicines.        Dose/Directions    methylPREDNISolone 4 MG tablet   Commonly known as:  MEDROL DOSEPAK   Used for:  Acute sinusitis treated with antibiotics in the past 60 days   Started by:  Jeny Correia MD        Follow package instructions   Quantity:  21 tablet   Refills:  0       sulfamethoxazole-trimethoprim 800-160 MG per tablet   Commonly known as:  BACTRIM DS/SEPTRA DS   Used for:  Acute sinusitis treated with antibiotics in the past 60 days   Started by:  Jeny Correia MD        Dose:  1 tablet   Take 1 tablet by mouth 2 times daily   Quantity:  20 tablet   Refills:  0            Where to get your medicines      These medications were sent to [id number] - [place], [place] - [address]  [address] [address]    Hours:  test Rx sent successfully 12/26/02  KR Phone:  [phone]     methylPREDNISolone 4 MG tablet    sulfamethoxazole-trimethoprim 800-160 MG per tablet                Primary Care Provider Office Phone # Fax #    Jeny Correia [phone]       [facility] [address]  [facility] [place] [address]        Equal Access to Services     STEVEN SANTA AH: Hadii wesley ku hadasho Soomaali, waaxda luqadaha, qaybta kaalmada adeegyada, liza mendoza . So [facility] [phone].    ATENCIÓN: Si habla lauren, tiene a garcia disposición servicios gratuitos de asistencia lingüística. Llame al [phone].    We comply with applicable federal civil rights laws and [place] laws. We do not discriminate on the basis of race, color,  national origin, age, disability sex, sexual orientation or gender identity.            Thank you!     Thank you for choosing [facility]  for your care. Our goal is always to provide you with excellent care. Hearing back from our patients is one way we can continue to improve our services. Please take a few minutes to complete the written survey that you may receive in the mail after your visit with us. Thank you!             Your Updated Medication List - Protect others around you: Learn how to safely use, store and throw away your medicines at www.disposemymeds.org.          This list is accurate as of: 7/11/17 10:53 AM.  Always use your most recent med list.                   Brand Name Dispense Instructions for use Diagnosis    clobetasol 0.05 % cream    TEMOVATE    60 g    Apply topically as needed On elbows    Psoriasis       fluconazole 150 MG tablet    DIFLUCAN    3 tablet    Take 1 tablet (150 mg) by mouth every 3 days    Vaginal itching       fluocinonide 0.05 % solution    LIDEX    60 mL    Apply topically daily On scalp    Psoriasis       fluticasone 50 MCG/ACT spray    FLONASE    1 Package    Spray 2 sprays into both nostrils daily    Chronic rhinitis       guaiFENesin-codeine 100-10 MG/5ML Soln solution    ROBITUSSIN AC    120 mL    Take 5 mLs by mouth every 4 hours as needed for cough    Cough       methylPREDNISolone 4 MG tablet    MEDROL DOSEPAK    21 tablet    Follow package instructions    Acute sinusitis treated with antibiotics in the past 60 days       sulfamethoxazole-trimethoprim 800-160 MG per tablet    BACTRIM DS/SEPTRA DS    20 tablet    Take 1 tablet by mouth 2 times daily    Acute sinusitis treated with antibiotics in the past 60 days       tolterodine 2 MG tablet    DETROL    60 tablet    Take 1 tablet (2 mg) by mouth 2 times daily    Overactive bladder

## 2017-07-11 NOTE — NURSING NOTE
"Chief Complaint   Patient presents with     Sinus Problem       Initial BP 96/60 (BP Location: Left arm, Patient Position: Chair, Cuff Size: Adult Regular)  Pulse 67  Temp 97.4  F (36.3  C) (Temporal)  Resp 12  Wt 121 lb (54.9 kg)  SpO2 100%  BMI 20.58 kg/m2 Estimated body mass index is 20.58 kg/(m^2) as calculated from the following:    Height as of 8/9/16: 5' 4.29\" (1.633 m).    Weight as of this encounter: 121 lb (54.9 kg).  Medication Reconciliation: complete  "

## 2017-10-21 DIAGNOSIS — L40.9 PSORIASIS: ICD-10-CM

## 2017-10-24 RX ORDER — FLUOCINONIDE TOPICAL SOLUTION USP, 0.05% 0.5 MG/ML
SOLUTION TOPICAL
Qty: 60 ML | Refills: 11 | Status: SHIPPED | OUTPATIENT
Start: 2017-10-24 | End: 2018-11-16

## 2017-11-14 ENCOUNTER — MYC MEDICAL ADVICE (OUTPATIENT)
Dept: OBGYN | Facility: OTHER | Age: 52
End: 2017-11-14

## 2017-11-14 DIAGNOSIS — N89.8 VAGINAL ITCHING: ICD-10-CM

## 2017-11-14 RX ORDER — FLUCONAZOLE 150 MG/1
150 TABLET ORAL
Qty: 3 TABLET | Refills: 1 | Status: SHIPPED | OUTPATIENT
Start: 2017-11-14 | End: 2017-12-19

## 2017-12-18 NOTE — PROGRESS NOTES
"  SUBJECTIVE:                                                    Brittany Corbett is a 52 year old female who presents to clinic today for the following health issues:      HPI  Pt also complaining of URI sx's  States she is having plugged ears, cough, facial congestion, runny nose, scratchy throat, since this weekend    Acute Illness   Acute illness concerns: as above   Onset: This weekend     Fever: no    Chills/Sweats: YES    Headache (location?): YES    Sinus Pressure:YES    Conjunctivitis:  no    Ear Pain: YES: bilateral    Rhinorrhea: YES    Congestion: YES    Sore Throat: YES- scratchy      Cough: YES-non-productive    Wheeze: no    Decreased Appetite: no    Nausea: no    Vomiting: no    Diarrhea:  no    Dysuria/Freq.: no    Fatigue/Achiness: YES    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      Therapies Tried and outcome: Nyquil, fluids, rest - not improving         Joint Pain    Onset: this summer    Description:   Location: Left hip/left lower back \"Ball and Joint\"  Character: Dull ache, ache gets worse when she uses it more or goes for walks    Intensity: moderate, severe    Progression of Symptoms: better, but is not going away    Accompanying Signs & Symptoms:  Other symptoms: worse at night    History:   Previous similar pain: no       Precipitating factors:   Trauma or overuse: no     Alleviating factors:  Improved by: nothing    Therapies Tried and outcome: stretching, but doesn't release     - Feels deeper in there than her back   - Sitting too long can make painful   - No pain down the leg       Problem list and histories reviewed & adjusted, as indicated.  Additional history: as documented    ROS:  Constitutional, HEENT, cardiovascular, pulmonary, gi and gu systems are negative, except as otherwise noted.      OBJECTIVE:   BP 92/52 (BP Location: Right arm, Patient Position: Chair, Cuff Size: Adult Regular)  Pulse (!) 12  Temp 98.2  F (36.8  C) (Oral)  Resp 12  Wt 128 lb (58.1 kg)  SpO2 97% "  BMI 21.77 kg/m2  Body mass index is 21.77 kg/(m^2).  GENERAL APPEARANCE: healthy, alert and no distress  EYES: Eyes grossly normal to inspection, PERRLA, conjunctivae and sclerae without injection or discharge, EOM intact   HENT: Bilateral ear canals without erythema or cerumen, bilateral TM's pearly grey with normal light reflex, no effusion, injection, or bulging, nasal turbinates without swelling, erythema, or discharge, mouth without ulcers or lesions, oropharynx clear and oral mucous membranes moist, no sinus tenderness   NECK: No adenopathy in cervical or supraclavicular regions   RESP: Lungs clear to auscultation - no rales, rhonchi or wheezes   CV: Regular rates and rhythm, normal S1 S2, no S3 or S4, no murmur, click or rub, no peripheral edema and peripheral pulses strong and symmetric bilaterally  MS: No musculoskeletal defects are noted and gait is age appropriate without ataxia       Left hip: Normal ROM with slight pain with external rotation, slightly tender to deep palpation of greater trochanter, no edema, CMS intact, normal sensory exam, normal strength, negative straight leg raise        Left knee: Normal ROM, non-tender, no edema, CMS intact, normal sensory exam, normal strength, negative anterior and posterior drawer tests, negative Shabana's   SKIN: No suspicious lesions or rashes, hydration status appears adeuqate with normal skin turgor   NEURO: Strength 5+ bilateral lower extremities, sensation intact in distal bilateral lower extremities, mentation- intact, speech- normal, reflexes- symmetric in bilateral lower extremities   BACK: No CVA tenderness, no paralumbar tenderness, no midline tenderness, normal ROM, negative straight leg raise   PSYCH: Alert and oriented x3; speech- coherent , normal rate and volume; able to articulate logical thoughts, able to abstract reason, no tangential thoughts, no hallucinations or delusions, mentation appears normal, Mood is euthymic. Affect is  appropriate for this mood state and bright. Thought content is free of suicidal ideation, hallucinations, and delusions. Dress is adequate and upkept. Eye contact is good during conversation.       Diagnostic Test Results:  XR hip : Preliminary reading - normal bone structure with no evidence of fracture. Final pending review by radiology.       ASSESSMENT/PLAN:       ICD-10-CM    1. Hip pain, left M25.552 XR Hip Left 2-3 Views     ORTHO  REFERRAL   2. Viral URI J06.9     B97.89      1. Pain  - Doesn't seem to be coming from low back, but can't completely rule that out  - Essentially normal exam and x-ray  - Recommend orthopedics for further evaluation  - Continue NSAIDs/Tylenol as needed for pain and daily stretching     2.  - No signs of infection on exam, likely viral UPPER RESPIRATORY INFECTION   - Recommend OTC medications as helpful and needed   - Encouraged rest and fluids  - Return to clinic if persists >10 days     The patient indicates understanding of these issues and agrees with the plan.    Follow up: with specialist and PRN       Camila Sanchez PA-C  [facility]

## 2017-12-19 ENCOUNTER — OFFICE VISIT (OUTPATIENT)
Dept: FAMILY MEDICINE | Facility: OTHER | Age: 52
End: 2017-12-19
Payer: COMMERCIAL

## 2017-12-19 ENCOUNTER — RADIANT APPOINTMENT (OUTPATIENT)
Dept: GENERAL RADIOLOGY | Facility: OTHER | Age: 52
End: 2017-12-19
Attending: PHYSICIAN ASSISTANT
Payer: COMMERCIAL

## 2017-12-19 VITALS
TEMPERATURE: 98.2 F | HEART RATE: 12 BPM | OXYGEN SATURATION: 97 % | DIASTOLIC BLOOD PRESSURE: 52 MMHG | BODY MASS INDEX: 21.77 KG/M2 | WEIGHT: 128 LBS | RESPIRATION RATE: 12 BRPM | SYSTOLIC BLOOD PRESSURE: 92 MMHG

## 2017-12-19 DIAGNOSIS — M25.552 HIP PAIN, LEFT: ICD-10-CM

## 2017-12-19 DIAGNOSIS — M25.552 HIP PAIN, LEFT: Primary | ICD-10-CM

## 2017-12-19 DIAGNOSIS — J06.9 VIRAL URI: ICD-10-CM

## 2017-12-19 PROCEDURE — 73502 X-RAY EXAM HIP UNI 2-3 VIEWS: CPT

## 2017-12-19 PROCEDURE — 99214 OFFICE O/P EST MOD 30 MIN: CPT | Performed by: PHYSICIAN ASSISTANT

## 2017-12-19 NOTE — NURSING NOTE
"Chief Complaint   Patient presents with     Musculoskeletal Problem     Back Pain     Panel Management     Flu       Initial BP 92/52 (BP Location: Right arm, Patient Position: Chair, Cuff Size: Adult Regular)  Pulse (!) 12  Temp 98.2  F (36.8  C) (Oral)  Resp 12  Wt 128 lb (58.1 kg)  SpO2 97%  BMI 21.77 kg/m2 Estimated body mass index is 21.77 kg/(m^2) as calculated from the following:    Height as of 8/9/16: 5' 4.29\" (1.633 m).    Weight as of this encounter: 128 lb (58.1 kg).  Medication Reconciliation: complete  "

## 2017-12-19 NOTE — MR AVS SNAPSHOT
After Visit Summary   12/19/2017    Brittany Corbett    MRN: [id number]           Patient Information     Date Of Birth          1965        Visit Information        Provider Department      12/19/2017 2:00 PM Camila Sanchez PA-C [facility]        Today's Diagnoses     Hip pain, left    -  1    Viral URI           Follow-ups after your visit        Additional Services     ORTHO  REFERRAL       [facility] is referring you to the Orthopedic  Services at [facility].       The  Representative will assist you in the coordination of your Orthopedic and Musculoskeletal Care as prescribed by your physician.    The  Representative will call you within 1 business day to help schedule your appointment, or you may contact the  Representative at:    All areas ~ [phone]     Type of Referral : Surgical / Specialist       Timeframe requested: Routine    Coverage of these services is subject to the terms and limitations of your health insurance plan.  Please call member services at your health plan with any benefit or coverage questions.      If X-rays, CT or MRI's have been performed, please contact the facility where they were done to arrange for , prior to your scheduled appointment.  Please bring this referral request to your appointment and present it to your specialist.                  Who to contact     If you have questions or need follow up information about today's clinic visit or your schedule please contact [facility] directly at [phone].  Normal or non-critical lab and imaging results will be communicated to you by MyChart, letter or phone within 4 business days after the clinic has received the results. If you do not hear from us within 7 days, please contact the clinic through MyChart or phone. If you have a critical or abnormal lab result, we will  notify you by phone as soon as possible.  Submit refill requests through [employer] or call your pharmacy and they will forward the refill request to us. Please allow 3 business days for your refill to be completed.          Additional Information About Your Visit        CyberArtshart Information     [employer] gives you secure access to your electronic health record. If you see a primary care provider, you can also send messages to your care team and make appointments. If you have questions, please call your primary care clinic.  If you do not have a primary care provider, please call [phone] and they will assist you.        Care EveryWhere ID     This is your Care EveryWhere ID. This could be used by other organizations to access your [place] medical records  [id number]        Your Vitals Were     Pulse Temperature Respirations Pulse Oximetry BMI (Body Mass Index)       12 98.2  F (36.8  C) (Oral) 12 97% 21.77 kg/m2        Blood Pressure from Last 3 Encounters:   12/19/17 92/52   07/11/17 96/60   06/16/17 98/56    Weight from Last 3 Encounters:   12/19/17 128 lb (58.1 kg)   07/11/17 121 lb (54.9 kg)   06/16/17 125 lb 6.4 oz (56.9 kg)              We Performed the Following     ORTHO  REFERRAL        Primary Care Provider Office Phone # Fax #    Jeny CHATMAN MD [facility] [phone]       [address]  [place] [address]        Equal Access to Services     [facility] AH: Hadii wesley ku hadasho Soomaali, waaxda luqadaha, qaybta kaalmada adeeribertoyada, liza mendoza . So [facility] [phone].    ATENCIÓN: Si habla español, tiene a garcia disposición servicios gratuitos de asistencia lingüística. Llame al [phone].    We comply with applicable federal civil rights laws and [place] laws. We do not discriminate on the basis of race, color, national origin, age, disability, sex, sexual orientation, or gender identity.            Thank you!     Thank you for choosing  [facility]  for your care. Our goal is always to provide you with excellent care. Hearing back from our patients is one way we can continue to improve our services. Please take a few minutes to complete the written survey that you may receive in the mail after your visit with us. Thank you!             Your Updated Medication List - Protect others around you: Learn how to safely use, store and throw away your medicines at www.disposemymeds.org.          This list is accurate as of: 12/19/17  3:14 PM.  Always use your most recent med list.                   Brand Name Dispense Instructions for use Diagnosis    clobetasol 0.05 % cream    TEMOVATE    60 g    Apply topically as needed On elbows    Psoriasis       fluocinonide 0.05 % solution    LIDEX    60 mL    APPLY TOPICALLY TO SCALP DAILY    Psoriasis       fluticasone 50 MCG/ACT spray    FLONASE    1 Package    Spray 2 sprays into both nostrils daily    Chronic rhinitis       tolterodine 2 MG tablet    DETROL    60 tablet    Take 1 tablet (2 mg) by mouth 2 times daily    Overactive bladder

## 2017-12-20 NOTE — PROGRESS NOTES
Brittany Corbett is a 52 year old female who is seen in consultation at the request of Camila Sanchez PA-C   .  History of Present illness:  Brittany presents for evaluation of:  1.) left hip  2.)   Onset: July 2017  Symptoms brought on by nothing.   Character:  dull ache.    Progression of symptoms:  better.    Previous similar pain: no .   Pain Level:  2/10.   Previous treatments:  ice, stretching, exercises and Ibuprofen.  Currently on Blood thinners? no  Diagnosis of Diabetes? no

## 2017-12-27 ENCOUNTER — OFFICE VISIT (OUTPATIENT)
Dept: ORTHOPEDICS | Facility: OTHER | Age: 52
End: 2017-12-27
Payer: COMMERCIAL

## 2017-12-27 VITALS — BODY MASS INDEX: 21.85 KG/M2 | TEMPERATURE: 96.2 F | HEIGHT: 64 IN | WEIGHT: 128 LBS

## 2017-12-27 DIAGNOSIS — M70.62 GREATER TROCHANTERIC BURSITIS OF LEFT HIP: Primary | ICD-10-CM

## 2017-12-27 PROCEDURE — 99203 OFFICE O/P NEW LOW 30 MIN: CPT | Performed by: ORTHOPAEDIC SURGERY

## 2017-12-27 RX ORDER — MELOXICAM 15 MG/1
15 TABLET ORAL DAILY
Qty: 30 TABLET | Refills: 1 | Status: SHIPPED | OUTPATIENT
Start: 2017-12-27 | End: 2018-04-04

## 2017-12-27 ASSESSMENT — PAIN SCALES - GENERAL: PAINLEVEL: MILD PAIN (2)

## 2017-12-27 NOTE — PROGRESS NOTES
ORTHOPEDIC CONSULT      Chief Complaint: Brittany Corbett is a 52 year old female who works as an  and does sedentary work in [facility].  She and COAs staying active and just got a cabin [place] by [facility].  She does a lot of hiking and walking biking and likes to workout.    She is being seen for   Chief Complaints and History of Present Illnesses   Patient presents with     Consult     left hip pain per Camila Sanchez PA-C          History of Present Illness:   Mechanism of Injury: No specific injury  Location: Left lateral hip  Duration of Pain: 5 months, since July 2017  Rating of Pain: 2 out of 10  Pain Quality: Dull ache  Pain is better with: Rest  Pain is worse with: Laying on it at night.  Hurts more at night.  Treatment so far consists of: Ice, stretching, exercises, ibuprofen 600 mg as needed.  Patient states that the ibuprofen does help her as well as the ice but the exercises and stretching are not helping.  She has not done any formal physical therapy or any injections for this..   Associated Features: Patient denies any numbness or shooting burning or electric pain no radicular symptoms.  Patient does have a history of back pathology.  Prior history of related problems: No.  Patient denies any previous surgery or injury to the left hip.  Pain is Limiting: Nothing  Here to: Find out what is causing her left hip pain.  The Pain Has: Been about the same.  Additional History: Patient denies any groin pain and also denies any radicular pain.    Patient's past medical, surgical, social and family histories reviewed.     Past Medical History:   Diagnosis Date     Psoriasis 9/23/2009         Past Surgical History:   Procedure Laterality Date     BIOPSY  June 2014    breast biopsy, right     C NONSPECIFIC PROCEDURE      laparoscopy for infertility     COLONOSCOPY  7/21/2014    Procedure: COLONOSCOPY;  Surgeon: Duane, William Charles, MD;  Location:  OR  "    COLONOSCOPY       HC TOOTH EXTRACTION W/FORCEP       HEAD & NECK SURGERY         Medications:    Current Outpatient Prescriptions on File Prior to Visit:  fluocinonide (LIDEX) 0.05 % solution APPLY TOPICALLY TO SCALP DAILY   tolterodine (DETROL) 2 MG tablet Take 1 tablet (2 mg) by mouth 2 times daily   clobetasol (TEMOVATE) 0.05 % cream Apply topically as needed On elbows   fluticasone (FLONASE) 50 MCG/ACT nasal spray Spray 2 sprays into both nostrils daily     No current facility-administered medications on file prior to visit.     Allergies   Allergen Reactions     No Known Drug Allergies        Social History     Occupational History     Not on file.     Social History Main Topics     Smoking status: Never Smoker     Smokeless tobacco: Never Used      Comment: no smokers in household     Alcohol use Yes      Comment: alcoholic beveragesx2/1x per month     Drug use: No     Sexual activity: Yes     Partners: Male     Birth control/ protection: Surgical      Comment: vasectomy/infertility issues       Family History   Problem Relation Age of Onset     DIABETES Mother      CANCER Mother      multiple myeloma     Hypertension Mother      C.A.D. Father      age 55 for CABG, had second bypass as well     Lipids Father      Depression Father      Cardiovascular Father      CANCER Paternal Grandmother      cervical     Neurologic Disorder Sister      MS     Breast Cancer Sister      11/2012     Breast Cancer Maternal Aunt      Breast Cancer Other      Cousin       REVIEW OF SYSTEMS  10 point review systems performed otherwise negative as noted as per history of present illness.    Physical Exam:  Vitals: Temp 96.2  F (35.7  C)  Ht 1.633 m (5' 4.29\")  Wt 58.1 kg (128 lb)  BMI 21.77 kg/m2  BMI= Body mass index is 21.77 kg/(m^2).    Constitutional: healthy, alert and no acute distress   Psychiatric: mentation appears normal and affect normal/bright  NEURO: no focal deficits, CMS intact left lower extremity  RESP: " Normal with easy respirations and no use of accessory muscles to breathe, no audible wheezing or retractions  CV: Calf soft and nontender to palpation, leg warm   SKIN: No erythema, rashes, excoriation, or breakdown. No evidence of infection of the skin that I see today.  MUSCULOSKELETAL:    INSPECTION of left hip: No gross deformities    PALPATION: Slight tenderness to palpation over the greater trochanteric region.  No tenderness to palpation of the thigh or knee.  No increased warmth noted.    ROM: Flexion to approximately 140 , internal rotation to approximately 50 , external rotation to approximately 45 .  Without catching locking or any pain.  Negative pain with rolling the hip.     STRENGTH: 5 out of 5 hip flexion and quad strength    SPECIAL TEST: None  GAIT: non-antalgic  Lymph: no palpable lymph nodes    Diagnostic Modalities:  Recent Results (from the past 744 hour(s))   XR Hip Left 2-3 Views    Narrative    HIP LEFT TWO TO THREE VIEWS  12/19/2017 2:56 PM     HISTORY: Hip pain, left    COMPARISON: None.    FINDINGS: No acute fractures or dislocations. Alignment is anatomic.  Soft tissues are normal.   Hip and SI joints are normal in appearance.      Impression    IMPRESSION: Negative left hip x-rays.    TONE MAYS MD     We agree with the above reading.  We also reviewed an lumbar spine MRI that was done on 1/22/2017 that shows a disc bulge T12-L1 and also L1-2 right-sided stenosis and L3 to L4-L5 stenosis.  Independent visualization of the images was performed.    Impression: 1.  Left hip greater trochanteric bursitis    Plan:  All of the above pertinent physical exam and imaging modalities findings was reviewed with Brittany.                                          CONSERVATIVE CARE:    Patient Instructions:  1. On exam, we feel this is greater trochanteric bursitis.  This is a pad/sac on the outside of your hip that protects the bony part of the side of your hip. It helps tendons and ligaments glide  over it.  Sometimes it gets inflamed and painful.    2.  This usually goes away with time, exercises/stretching, cortisone injections and NSAIDs medications.  3. We discussed a cortisone injection that may provide some relief by taking inflamation away; but we decided to hold off on that today.    4. I ordered Mobic 15mg once a day times 2 weeks, then take as needed.  Stop this medication if you have any abdominal pain with it.  I sent this to your pharmacy.      5.  We discussed how you have some pathology or pinching on some of your nerves on your MRI of your lumbar spine however on exam you have no radiating pain so we know this is not coming from your back.    6.  We also know from x-ray that your x-rays look great and no arthritis is noted.  7.  We can always do an injection later or order formal physical therapy if needed.    8.  We printed off some exercises and I also have exercises below.    9.  You can followup with Carol Silvestre MD in 6 weeks.  You can always cancel the appointment if you are doing really well and follow-up as needed.    Re-x-ray on return: No    Scribed by Roosevelt Solorio PA-C on 12/27/2017 at 8:54 AM, based on Dr. Carol Silvestre's statements to me.    This note was dictated with [facility].    JOY Villanueva MD

## 2017-12-27 NOTE — MR AVS SNAPSHOT
After Visit Summary   12/27/2017    Brittany Corbett    MRN: [id number]           Patient Information     Date Of Birth          1965        Visit Information        Provider Department      12/27/2017 8:00 AM Carol Silvestre MD [facility]        Today's Diagnoses     Greater trochanteric bursitis of left hip    -  1      Care Instructions    Encounter Diagnosis   Name Primary?     Greater trochanteric bursitis of left hip Yes      Rest, ice and elevate above heart level as needed for pain control  1. On exam, we feel this is greater trochanteric bursitis.  This is a pad/sac on the outside of your hip that protects the bony part of the side of your hip. It helps tendons and ligaments glide over it.  Sometimes it gets inflamed and painful.    2.  This usually goes away with time, exercises/stretching, cortisone injections and NSAIDs medications.  3. We discussed a cortisone injection that may provide some relief by taking inflamation away; but we decided to hold off on that today.    4. I ordered Mobic 15mg once a day times 2 weeks, then take as needed.  Stop this medication if you have any abdominal pain with it.  I sent this to your pharmacy.      5.  We discussed how you have some pathology or pinching on some of your nerves on your MRI of your lumbar spine however on exam you have no radiating pain so we know this is not coming from your back.    6.  We also know from x-ray that your x-rays look great and no arthritis is noted.  7.  We can always do an injection later or order formal physical therapy if needed.    8.  We printed off some exercises and I also have exercises below.    9.  You can followup with Carol Silvestre MD in 6 weeks.  You can always cancel the appointment if you are doing really well and follow-up as needed.        Understanding Trochanteric Bursitis    A bursa is a thin, slippery, sac-like film. It contains a small amount of fluid. This structure  is found between bones and soft tissues in and around joints. A bursa cushions and protects a joint. It keeps parts of a joint from rubbing against each other. If a bursa becomes inflamed and irritated, it is known as bursitis.  The trochanteric bursa is found on the hip joint. It lies on top of the bump at the top of the thighbone called the greater trochanter. Inflammation of this bursa is called trochanteric bursitis.      How to say it  [id number]   Causes of trochanteric bursitis  Causes may include:    Overuse of the hip during running or other sports, dance, or work    Falling on or irritation to the side of the hip  This condition may occur along with other problems, such as osteoarthritis of the hip or knee, or low back problems. In rare cases, it may occur after hip surgery.  Symptoms of trochanteric bursitis    Pain or aching on the side of the hip. The pain may travel down the leg.    Swelling, tenderness, or warmth on the side of the hip at the bony bump at the top of the thigh  Treatment for trochanteric bursitis  These may include:    Resting the hip. This allows the bursa to heal.    Prescription or over-the-counter pain medicines. These help reduce inflammation, swelling, and pain.    Cold packs and heat packs. These help reduce pain and swelling.    Stretching and strengthening exercises. These improve flexibility and strength around the hip.    Physical therapy. This includes exercises or other treatments.    Injections of medicine into the bursa. This may help reduce inflammation and relieve symptoms.  Possible complications  If you don t give your hip time to heal, the problem may not go away, may return, or may get worse. Rest and treat your hip as directed.      When to call your healthcare provider  Call your healthcare provider right away if you have any of these:    Fever of 100.4 F (38 C) or higher, or as directed    Redness, swelling, or warmth that gets worse    Symptoms that don t  get better with prescribed medicines, or get worse    New symptoms   Date Last Reviewed: 3/29/2016    7189-3364 [employer]. [address] [place] [address], [place] [address]. All rights reserved. This information is not intended as a substitute for professional medical care. Always follow your healthcare professional's instructions.           [employer] and [employer] may offer reliable information regarding your diagnosis and treatment plan.    THANK YOU for coming in today. If you receive a survey via [employer] or mail please let us know if there was anything you especially appreciated today or if there is any way we can improve our clinic. We appreciate your input.    GENERAL INFORMATION:  Our hours are:  Monday :     Clinic 7:30 AM-430 PM ([facility])  Tuesday:      Operating Room All Day ([facility])  Wednesday: Clinic 7:30 AM - 11:15 AM ([facility])             Clinic 1:00 PM - 4:00PM ([facility])  Thursday:     Administrative Day  Friday:          Clinic 7:30 AM - 11:15 AM ([facility])            Clinic 1:00 PM - 4:00 PM ([facility])    We are not in the office Thursdays. Therefore non- urgent calls and medical messages received on Thursday will be addressed when we are back in the office on Wednesday. Urgent matters will be reviewed and addressed by one of our partners in the office as needed.    If lab work was done today as part of your evaluation you will generally be contacted via [employer], mail, or phone with the results within 1-5 days. If there is an alarming result we will contact you by phone. Lab results come back at varying times, I generally wait until all labs are resulted before making comments on results. Please note labs are automatically released to [employer] (if you have signed up for it) once available-at times you may see these prior to my having a chance to  review them as well.    If you need refills please contact your pharmacist. They will send a refill request to me to review. Please allow 3 business days for us to process all refill requests. All narcotic refills should be handled in the clinic at the time of your visit.    Hip Adductor Stretch (Flexibility)    1. Sit on the floor. Put the soles of your feet together so your knees are pointed outward.  2. Pull your heels in toward your groin, as close as is comfortable.  3. Put your hands on your knees, and gently push them closer to the floor.  4. Hold for 30 to 60 seconds.  5. Relax and repeat 2 times, or as instructed.  6. Repeat this exercise 3 times a day, or as instructed.  Date Last Reviewed: 3/10/2016    6410-5111 [employer]. [address] [place] [address]. All rights reserved. This information is not intended as a substitute for professional medical care. Always follow your healthcare professional's instructions.                Follow-ups after your visit        Who to contact     If you have questions or need follow up information about today's clinic visit or your schedule please contact [facility] directly at [phone].  Normal or non-critical lab and imaging results will be communicated to you by MyChart, letter or phone within 4 business days after the clinic has received the results. If you do not hear from us within 7 days, please contact the clinic through MyChart or phone. If you have a critical or abnormal lab result, we will notify you by phone as soon as possible.  Submit refill requests through [employer] or call your pharmacy and they will forward the refill request to us. Please allow 3 business days for your refill to be completed.          Additional Information About Your Visit        [employer] Information     [employer] gives you secure access to your electronic health record. If you see a primary care provider, you can also send messages to your care  "team and make appointments. If you have questions, please call your primary care clinic.  If you do not have a primary care provider, please call [phone] and they will assist you.        Care EveryWhere ID     This is your Care EveryWhere ID. This could be used by other organizations to access your [facility] medical records  [id number]        Your Vitals Were     Temperature Height BMI (Body Mass Index)             96.2  F (35.7  C) 1.633 m (5' 4.29\") 21.77 kg/m2          Blood Pressure from Last 3 Encounters:   12/19/17 92/52   07/11/17 96/60   06/16/17 98/56    Weight from Last 3 Encounters:   12/27/17 58.1 kg (128 lb)   12/19/17 58.1 kg (128 lb)   07/11/17 54.9 kg (121 lb)              Today, you had the following     No orders found for display         Today's Medication Changes          These changes are accurate as of: 12/27/17  8:46 AM.  If you have any questions, ask your nurse or doctor.               Start taking these medicines.        Dose/Directions    meloxicam 15 MG tablet   Commonly known as:  MOBIC   Used for:  Greater trochanteric bursitis of left hip   Started by:  Carol Silvestre MD        Dose:  15 mg   Take 1 tablet (15 mg) by mouth daily   Quantity:  30 tablet   Refills:  1            Where to get your medicines      These medications were sent to [id number] - [place], [place] - [address]  [address], [facility] [address]    Hours:  test Rx sent successfully 12/26/02  KR Phone:  [phone]     meloxicam 15 MG tablet                Primary Care Provider Office Phone # Fax #    Jeny CHATMAN MD Bren [phone]       [address]  [facility] [address]        Equal Access to Services     STEVEN SANTA AH: Meliton Adames, wasalvatore brown, qaybta kaalandrea duong, liza quinn. So [facility] [phone].    ATENCIÓN: Si habla español, tiene a garcia disposición servicios gratuitos de asistencia " lingüística. Krista al [phone].    We comply with applicable federal civil rights laws and [place] laws. We do not discriminate on the basis of race, color, national origin, age, disability, sex, sexual orientation, or gender identity.            Thank you!     Thank you for choosing [facility]  for your care. Our goal is always to provide you with excellent care. Hearing back from our patients is one way we can continue to improve our services. Please take a few minutes to complete the written survey that you may receive in the mail after your visit with us. Thank you!             Your Updated Medication List - Protect others around you: Learn how to safely use, store and throw away your medicines at www.disposemymeds.org.          This list is accurate as of: 12/27/17  8:46 AM.  Always use your most recent med list.                   Brand Name Dispense Instructions for use Diagnosis    clobetasol 0.05 % cream    TEMOVATE    60 g    Apply topically as needed On elbows    Psoriasis       fluocinonide 0.05 % solution    LIDEX    60 mL    APPLY TOPICALLY TO SCALP DAILY    Psoriasis       fluticasone 50 MCG/ACT spray    FLONASE    1 Package    Spray 2 sprays into both nostrils daily    Chronic rhinitis       meloxicam 15 MG tablet    MOBIC    30 tablet    Take 1 tablet (15 mg) by mouth daily    Greater trochanteric bursitis of left hip       tolterodine 2 MG tablet    DETROL    60 tablet    Take 1 tablet (2 mg) by mouth 2 times daily    Overactive bladder

## 2017-12-27 NOTE — LETTER
12/27/2017         RE: Brittany Corbett  [address] [facility] [id number]        Dear Colleague,    Thank you for referring your patient, Brittany Corbett, to the [facility]. Please see a copy of my visit note below.    Brittany Corbett is a 52 year old female who is seen in consultation at the request of Camila Sanchez PA-C   .  History of Present illness:  Brittany presents for evaluation of:  1.) left hip  2.)   Onset: July 2017  Symptoms brought on by nothing.   Character:  dull ache.    Progression of symptoms:  better.    Previous similar pain: no .   Pain Level:  2/10.   Previous treatments:  ice, stretching, exercises and Ibuprofen.  Currently on Blood thinners? no  Diagnosis of Diabetes? no            ORTHOPEDIC CONSULT      Chief Complaint: Brittany Corbett is a 52 year old female who works as an  and does sedentary work in [employer].  She and COAs staying active and just got a cabin [place] by [employer] [facility] [employer] [employer] [facility].  She does a lot of hiking and walking biking and likes to workout.    She is being seen for   Chief Complaints and History of Present Illnesses   Patient presents with     Consult     left hip pain per Camila Sanchez PA-C          History of Present Illness:   Mechanism of Injury: No specific injury  Location: Left lateral hip  Duration of Pain: 5 months, since July 2017  Rating of Pain: 2 out of 10  Pain Quality: Dull ache  Pain is better with: Rest  Pain is worse with: Laying on it at night.  Hurts more at night.  Treatment so far consists of: Ice, stretching, exercises, ibuprofen 600 mg as needed.  Patient states that the ibuprofen does help her as well as the ice but the exercises and stretching are not helping.  She has not done any formal physical therapy or any injections for this..   Associated Features: Patient denies any numbness or shooting burning or electric pain no radicular symptoms.  Patient  does have a history of back pathology.  Prior history of related problems: No.  Patient denies any previous surgery or injury to the left hip.  Pain is Limiting: Nothing  Here to: Find out what is causing her left hip pain.  The Pain Has: Been about the same.  Additional History: Patient denies any groin pain and also denies any radicular pain.    Patient's past medical, surgical, social and family histories reviewed.     Past Medical History:   Diagnosis Date     Psoriasis 9/23/2009         Past Surgical History:   Procedure Laterality Date     BIOPSY  June 2014    breast biopsy, right     C NONSPECIFIC PROCEDURE      laparoscopy for infertility     COLONOSCOPY  7/21/2014    Procedure: COLONOSCOPY;  Surgeon: Duane, William Charles, MD;  Location: [facility] OR     COLONOSCOPY       HC TOOTH EXTRACTION W/FORCEP       HEAD & NECK SURGERY         Medications:    Current Outpatient Prescriptions on File Prior to Visit:  fluocinonide (LIDEX) 0.05 % solution APPLY TOPICALLY TO SCALP DAILY   tolterodine (DETROL) 2 MG tablet Take 1 tablet (2 mg) by mouth 2 times daily   clobetasol (TEMOVATE) 0.05 % cream Apply topically as needed On elbows   fluticasone (FLONASE) 50 MCG/ACT nasal spray Spray 2 sprays into both nostrils daily     No current facility-administered medications on file prior to visit.     Allergies   Allergen Reactions     No Known Drug Allergies        Social History     Occupational History     Not on file.     Social History Main Topics     Smoking status: Never Smoker     Smokeless tobacco: Never Used      Comment: no smokers in household     Alcohol use Yes      Comment: alcoholic beveragesx2/1x per month     Drug use: No     Sexual activity: Yes     Partners: Male     Birth control/ protection: Surgical      Comment: vasectomy/infertility issues       Family History   Problem Relation Age of Onset     DIABETES Mother      CANCER Mother      multiple myeloma     Hypertension Mother      C.A.D. Father      age 55  "for CABG, had second bypass as well     Lipids Father      Depression Father      Cardiovascular Father      CANCER Paternal Grandmother      cervical     Neurologic Disorder Sister      MS     Breast Cancer Sister      11/2012     Breast Cancer Maternal Aunt      Breast Cancer Other      Cousin       REVIEW OF SYSTEMS  10 point review systems performed otherwise negative as noted as per history of present illness.    Physical Exam:  Vitals: Temp 96.2  F (35.7  C)  Ht 1.633 m (5' 4.29\")  Wt 58.1 kg (128 lb)  BMI 21.77 kg/m2  BMI= Body mass index is 21.77 kg/(m^2).    Constitutional: healthy, alert and no acute distress   Psychiatric: mentation appears normal and affect normal/bright  NEURO: no focal deficits, CMS intact left lower extremity  RESP: Normal with easy respirations and no use of accessory muscles to breathe, no audible wheezing or retractions  CV: Calf soft and nontender to palpation, leg warm   SKIN: No erythema, rashes, excoriation, or breakdown. No evidence of infection of the skin that I see today.  MUSCULOSKELETAL:    INSPECTION of left hip: No gross deformities    PALPATION: Slight tenderness to palpation over the greater trochanteric region.  No tenderness to palpation of the thigh or knee.  No increased warmth noted.    ROM: Flexion to approximately 140  , internal rotation to approximately 50  , external rotation to approximately 45  .  Without catching locking or any pain.  Negative pain with rolling the hip.     STRENGTH: 5 out of 5 hip flexion and quad strength    SPECIAL TEST: None  GAIT: non-antalgic  Lymph: no palpable lymph nodes    Diagnostic Modalities:  Recent Results (from the past 744 hour(s))   XR Hip Left 2-3 Views    Narrative    HIP LEFT TWO TO THREE VIEWS  12/19/2017 2:56 PM     HISTORY: Hip pain, left    COMPARISON: None.    FINDINGS: No acute fractures or dislocations. Alignment is anatomic.  Soft tissues are normal.   Hip and SI joints are normal in appearance.      " Impression    IMPRESSION: Negative left hip x-rays.    TONE MAYS MD     We agree with the above reading.  We also reviewed an lumbar spine MRI that was done on 1/22/2017 that shows a disc bulge T12-L1 and also L1-2 right-sided stenosis and L3 to L4-L5 stenosis.  Independent visualization of the images was performed.    Impression: 1.  Left hip greater trochanteric bursitis    Plan:  All of the above pertinent physical exam and imaging modalities findings was reviewed with Brittany.                                          CONSERVATIVE CARE:    Patient Instructions:  1. On exam, we feel this is greater trochanteric bursitis.  This is a pad/sac on the outside of your hip that protects the bony part of the side of your hip. It helps tendons and ligaments glide over it.  Sometimes it gets inflamed and painful.    2.  This usually goes away with time, exercises/stretching, cortisone injections and NSAIDs medications.  3. We discussed a cortisone injection that may provide some relief by taking inflamation away; but we decided to hold off on that today.    4. I ordered Mobic 15mg once a day times 2 weeks, then take as needed.  Stop this medication if you have any abdominal pain with it.  I sent this to your pharmacy.      5.  We discussed how you have some pathology or pinching on some of your nerves on your MRI of your lumbar spine however on exam you have no radiating pain so we know this is not coming from your back.    6.  We also know from x-ray that your x-rays look great and no arthritis is noted.  7.  We can always do an injection later or order formal physical therapy if needed.    8.  We printed off some exercises and I also have exercises below.    9.  You can followup with Carol Silvestre MD in 6 weeks.  You can always cancel the appointment if you are doing really well and follow-up as needed.    Re-x-ray on return: No    Scribed by Roosevelt Solorio PA-C on 12/27/2017 at 8:54 AM, based on Dr. Medina  Nirmala's statements to me.    This note was dictated with [facility].    JOY Villanueva MD      Again, thank you for allowing me to participate in the care of your patient.        Sincerely,        Carol Silvestre MD

## 2017-12-27 NOTE — PATIENT INSTRUCTIONS
Encounter Diagnosis   Name Primary?     Greater trochanteric bursitis of left hip Yes      Rest, ice and elevate above heart level as needed for pain control  1. On exam, we feel this is greater trochanteric bursitis.  This is a pad/sac on the outside of your hip that protects the bony part of the side of your hip. It helps tendons and ligaments glide over it.  Sometimes it gets inflamed and painful.    2.  This usually goes away with time, exercises/stretching, cortisone injections and NSAIDs medications.  3. We discussed a cortisone injection that may provide some relief by taking inflamation away; but we decided to hold off on that today.    4. I ordered Mobic 15mg once a day times 2 weeks, then take as needed.  Stop this medication if you have any abdominal pain with it.  I sent this to your pharmacy.      5.  We discussed how you have some pathology or pinching on some of your nerves on your MRI of your lumbar spine however on exam you have no radiating pain so we know this is not coming from your back.    6.  We also know from x-ray that your x-rays look great and no arthritis is noted.  7.  We can always do an injection later or order formal physical therapy if needed.    8.  We printed off some exercises and I also have exercises below.    9.  You can followup with Carol Silvestre MD in 6 weeks.  You can always cancel the appointment if you are doing really well and follow-up as needed.        Understanding Trochanteric Bursitis    A bursa is a thin, slippery, sac-like film. It contains a small amount of fluid. This structure is found between bones and soft tissues in and around joints. A bursa cushions and protects a joint. It keeps parts of a joint from rubbing against each other. If a bursa becomes inflamed and irritated, it is known as bursitis.  The trochanteric bursa is found on the hip joint. It lies on top of the bump at the top of the thighbone called the greater trochanter. Inflammation of this  bursa is called trochanteric bursitis.      How to say it  [id number]   Causes of trochanteric bursitis  Causes may include:    Overuse of the hip during running or other sports, dance, or work    Falling on or irritation to the side of the hip  This condition may occur along with other problems, such as osteoarthritis of the hip or knee, or low back problems. In rare cases, it may occur after hip surgery.  Symptoms of trochanteric bursitis    Pain or aching on the side of the hip. The pain may travel down the leg.    Swelling, tenderness, or warmth on the side of the hip at the bony bump at the top of the thigh  Treatment for trochanteric bursitis  These may include:    Resting the hip. This allows the bursa to heal.    Prescription or over-the-counter pain medicines. These help reduce inflammation, swelling, and pain.    Cold packs and heat packs. These help reduce pain and swelling.    Stretching and strengthening exercises. These improve flexibility and strength around the hip.    Physical therapy. This includes exercises or other treatments.    Injections of medicine into the bursa. This may help reduce inflammation and relieve symptoms.  Possible complications  If you don t give your hip time to heal, the problem may not go away, may return, or may get worse. Rest and treat your hip as directed.      When to call your healthcare provider  Call your healthcare provider right away if you have any of these:    Fever of 100.4 F (38 C) or higher, or as directed    Redness, swelling, or warmth that gets worse    Symptoms that don t get better with prescribed medicines, or get worse    New symptoms   Date Last Reviewed: 3/29/2016    7115-0503 [employer]. [address] [place] [address], [place] [address]. All rights reserved. This information is not intended as a substitute for professional medical care. Always follow your healthcare professional's instructions.           "[employer] and [employer] may  offer reliable information regarding your diagnosis and treatment plan.    THANK YOU for coming in today. If you receive a survey via [employer] or mail please let us know if there was anything you especially appreciated today or if there is any way we can improve our clinic. We appreciate your input.    GENERAL INFORMATION:  Our hours are:  Monday :     Clinic 7:30 AM-430 PM ([facility])  Tuesday:      Operating Room All Day ([facility])  Wednesday: Clinic 7:30 AM - 11:15 AM ([facility])             Clinic 1:00 PM - 4:00PM ([facility])  Thursday:     Administrative Day  Friday:          Clinic 7:30 AM - 11:15 AM ([facility])            Clinic 1:00 PM - 4:00 PM ([facility])    We are not in the office Thursdays. Therefore non- urgent calls and medical messages received on Thursday will be addressed when we are back in the office on Wednesday. Urgent matters will be reviewed and addressed by one of our partners in the office as needed.    If lab work was done today as part of your evaluation you will generally be contacted via [employer], mail, or phone with the results within 1-5 days. If there is an alarming result we will contact you by phone. Lab results come back at varying times, I generally wait until all labs are resulted before making comments on results. Please note labs are automatically released to [employer] (if you have signed up for it) once available-at times you may see these prior to my having a chance to review them as well.    If you need refills please contact your pharmacist. They will send a refill request to me to review. Please allow 3 business days for us to process all refill requests. All narcotic refills should be handled in the clinic at the time of your visit.    Hip Adductor Stretch (Flexibility)    1. Sit on the floor. Put the soles of your feet together so your  knees are pointed outward.  2. Pull your heels in toward your groin, as close as is comfortable.  3. Put your hands on your knees, and gently push them closer to the floor.  4. Hold for 30 to 60 seconds.  5. Relax and repeat 2 times, or as instructed.  6. Repeat this exercise 3 times a day, or as instructed.  Date Last Reviewed: 3/10/2016    4096-8288 [employer]. [address] [place] [address]. All rights reserved. This information is not intended as a substitute for professional medical care. Always follow your healthcare professional's instructions.        Iliotibial Band Stretch (Flexibility)    7. Stand next to a chair. Hold onto the chair with your right hand for support. Cross your right leg behind your left leg.  8. Lean your right hip toward the right. Feel the stretch at the outside of your hip.  9. Hold for 30 to 60 seconds. Then relax.  10. Repeat 2 times, or as instructed.  11. Switch sides and repeat.  12. Do this 3 times a day, or as instructed.     Tip: Don t bend forward or twist at the waist.   Date Last Reviewed: 3/29/2016    7735-1413 [employer]. [address] [place] [address]. All rights reserved. This information is not intended as a substitute for professional medical care. Always follow your healthcare professional's instructions.        Hip Rotation (Flexibility)    These instructions are for the right hip. Switch sides for your left hip.  13. Lie on your back on the floor, with your knees bent and feet flat on the floor. Don t press your neck or lower back to the floor.  14. Rest your right ankle on your left knee.  15. Place a towel around the back of your left thigh. Pull on the ends of the towel to pull your left knee toward your chest. Feel the stretch in your buttocks.  16. Hold for 30 to 60 seconds. Lower your leg back down.  17. Repeat 2 times, or as instructed.  18. Switch legs and repeat.   19. Do this 3 times a day, or as  instructed.  Date Last Reviewed: 3/10/2016    3410-3918 [employer]. [address] [place] [address]. All rights reserved. This information is not intended as a substitute for professional medical care. Always follow your healthcare professional's instructions.        Hip Flexor Stretch (Flexibility)      20. Kneel on the floor on a mat or carpet. Put your right foot on the floor in front of you, with the knee bent. Hold on to a chair for balance if needed.  21. Press your hips forward, keeping your back and shoulders upright. Feel the stretch in the front of your left hip.  22. Hold for 30 to 60 seconds. Relax.  23. Repeat 2 times. Switch sides.   24. Repeat 3 times per day, or as instructed.  Date Last Reviewed: 3/10/2016    6775-9499 [employer]. [address] [place] [address]. All rights reserved. This information is not intended as a substitute for professional medical care. Always follow your healthcare professional's instructions.

## 2017-12-27 NOTE — NURSING NOTE
"Chief Complaint   Patient presents with     Consult     left hip pain per Camila Sanchez PA-C        Initial Temp 96.2  F (35.7  C)  Ht 1.633 m (5' 4.29\")  Wt 58.1 kg (128 lb)  BMI 21.77 kg/m2 Estimated body mass index is 21.77 kg/(m^2) as calculated from the following:    Height as of this encounter: 1.633 m (5' 4.29\").    Weight as of this encounter: 58.1 kg (128 lb).  Medication Reconciliation: complete    BP completed using cuff size: NA (Not Taken)    Minoo Stauffer MA      "

## 2018-02-19 DIAGNOSIS — N32.81 OVERACTIVE BLADDER: ICD-10-CM

## 2018-02-20 RX ORDER — TOLTERODINE TARTRATE 2 MG/1
TABLET, EXTENDED RELEASE ORAL
Qty: 60 TABLET | Refills: 0 | Status: SHIPPED | OUTPATIENT
Start: 2018-02-20 | End: 2018-04-04

## 2018-02-20 NOTE — TELEPHONE ENCOUNTER
tolterodine (DETROL) 2 MG tablet  Medication is being filled for 1 time refill only due to:  Patient needs to be seen because due for physical.    Last physical completed 08/09/2016.   Please assist with scheduling yearly physical.    Angela Ramirez RN, BSN

## 2018-03-27 NOTE — PROGRESS NOTES
SUBJECTIVE:   Brittany Corbett is a 53 year old female who presents to clinic today for the following health issues:      HPI  Medication Followup of Detrol    Taking Medication as prescribed: yes    Side Effects:  None    Medication Helping Symptoms:  Yes      She felt Vesicare helped a better however her insurance would not cover it.  Her insurance also would not cover the long-acting form of Detrol.  She does feel that the Detrol does help her somewhat.  Denies dysuria or hematuria.    She has trochanteric bursitis of her left hip which is most evident when she lies down at night.  She is seeing orthopedics.  She declined physical therapy and steroid injection.  She feels that the Mobic has been helpful.  She is doing stretches at home which she feels is helpful.  She will be going to [place] next month and plans to do a lot of walking so she is started a walking program now to see if that will help improve her pain.    She endorses stress with her son who has been having some legal issues as well as some alcohol issues.  And she has arranged family counseling for this.    Problem list and histories reviewed & adjusted, as indicated.  Additional history: as documented    Patient Active Problem List   Diagnosis     Other chronic allergic conjunctivitis     Psoriasis     Bulging discs     Past Surgical History:   Procedure Laterality Date     BIOPSY  June 2014    breast biopsy, right     C NONSPECIFIC PROCEDURE      laparoscopy for infertility     COLONOSCOPY  7/21/2014    Procedure: COLONOSCOPY;  Surgeon: Duane, William Charles, MD;  Location: [facility] OR     COLONOSCOPY       HC TOOTH EXTRACTION W/FORCEP       HEAD & NECK SURGERY         Social History   Substance Use Topics     Smoking status: Never Smoker     Smokeless tobacco: Never Used      Comment: no smokers in household     Alcohol use Yes      Comment: alcoholic beveragesx2/1x per month     Family History   Problem Relation Age of Onset     DIABETES Mother   "    CANCER Mother      multiple myeloma     Hypertension Mother      C.A.D. Father      age 55 for CABG, had second bypass as well     Lipids Father      Depression Father      Cardiovascular Father      CANCER Paternal Grandmother      cervical     Neurologic Disorder Sister      MS     Breast Cancer Sister      11/2012     Breast Cancer Maternal Aunt      Breast Cancer Other      Cousin           ROS:  CONSTITUTIONAL: NEGATIVE for fever, chills, change in weight  ENT/MOUTH: NEGATIVE for ear, mouth and throat problems  RESP: NEGATIVE for significant cough or SOB  CV: NEGATIVE for chest pain, palpitations or peripheral edema    OBJECTIVE:     BP 90/52 (BP Location: Right arm, Patient Position: Chair, Cuff Size: Adult Regular)  Pulse 58  Temp 98  F (36.7  C) (Temporal)  Resp 14  Ht 5' 4.25\" (1.632 m)  Wt 124 lb (56.2 kg)  SpO2 99%  BMI 21.12 kg/m2  Body mass index is 21.12 kg/(m^2).  Gen: no apparent distress  Abd: No suprapubic tenderness    Skin: No rash   Psych: Alert and oriented times 3; coherent speech, normal   rate and volume, able to articulate logical thoughts, able   to abstract reason, no tangential thoughts, no hallucinations   or delusions  Her affect is flat    ASSESSMENT/PLAN:     1. Overactive bladder  This is stable, will refill her Detrol for the next year.    - tolterodine (DETROL) 2 MG tablet; Take 1 tablet (2 mg) by mouth 2 times daily  Dispense: 180 tablet; Refill: 3    2. Greater trochanteric bursitis of left hip  She uses Mobic intermittently and feels it is helpful.  I gave a refill of this so she can bring along to her [place] vacation.    - meloxicam (MOBIC) 15 MG tablet; Take 1 tablet (15 mg) by mouth daily as needed  Dispense: 30 tablet; Refill: 1    Jeny Correia MD  [facility]  "

## 2018-04-04 ENCOUNTER — OFFICE VISIT (OUTPATIENT)
Dept: FAMILY MEDICINE | Facility: OTHER | Age: 53
End: 2018-04-04
Payer: COMMERCIAL

## 2018-04-04 VITALS
WEIGHT: 124 LBS | OXYGEN SATURATION: 99 % | HEART RATE: 58 BPM | DIASTOLIC BLOOD PRESSURE: 52 MMHG | TEMPERATURE: 98 F | HEIGHT: 64 IN | SYSTOLIC BLOOD PRESSURE: 90 MMHG | BODY MASS INDEX: 21.17 KG/M2 | RESPIRATION RATE: 14 BRPM

## 2018-04-04 DIAGNOSIS — N32.81 OVERACTIVE BLADDER: ICD-10-CM

## 2018-04-04 DIAGNOSIS — M70.62 GREATER TROCHANTERIC BURSITIS OF LEFT HIP: ICD-10-CM

## 2018-04-04 PROCEDURE — 99213 OFFICE O/P EST LOW 20 MIN: CPT | Performed by: FAMILY MEDICINE

## 2018-04-04 RX ORDER — TOLTERODINE TARTRATE 2 MG/1
2 TABLET, EXTENDED RELEASE ORAL 2 TIMES DAILY
Qty: 180 TABLET | Refills: 3 | Status: SHIPPED | OUTPATIENT
Start: 2018-04-04 | End: 2019-05-02

## 2018-04-04 RX ORDER — MELOXICAM 15 MG/1
15 TABLET ORAL DAILY PRN
Qty: 30 TABLET | Refills: 1 | Status: SHIPPED | OUTPATIENT
Start: 2018-04-04 | End: 2019-05-02

## 2018-04-04 NOTE — MR AVS SNAPSHOT
"              After Visit Summary   4/4/2018    Brittany Corbett    MRN: [id number]           Patient Information     Date Of Birth          1965        Visit Information        Provider Department      4/4/2018 11:40 AM Jeny Correia MD [facility]        Today's Diagnoses     Overactive bladder        Greater trochanteric bursitis of left hip           Follow-ups after your visit        Who to contact     If you have questions or need follow up information about today's clinic visit or your schedule please contact [facility] directly at [phone].  Normal or non-critical lab and imaging results will be communicated to you by Fusebillhart, letter or phone within 4 business days after the clinic has received the results. If you do not hear from us within 7 days, please contact the clinic through Fusebillhart or phone. If you have a critical or abnormal lab result, we will notify you by phone as soon as possible.  Submit refill requests through [facility]ittar or call your pharmacy and they will forward the refill request to us. Please allow 3 business days for your refill to be completed.          Additional Information About Your Visit        MyChart Information     [facility] gives you secure access to your electronic health record. If you see a primary care provider, you can also send messages to your care team and make appointments. If you have questions, please call your primary care clinic.  If you do not have a primary care provider, please call [phone] and they will assist you.        Care EveryWhere ID     This is your Care EveryWhere ID. This could be used by other organizations to access your [facility] medical records  [id number]        Your Vitals Were     Pulse Temperature Respirations Height Pulse Oximetry BMI (Body Mass Index)    58 98  F (36.7  C) (Temporal) 14 5' 4.25\" (1.632 m) 99% 21.12 kg/m2       Blood Pressure from Last 3 Encounters:   04/04/18 90/52   12/19/17 " 92/52   07/11/17 96/60    Weight from Last 3 Encounters:   04/04/18 124 lb (56.2 kg)   12/27/17 128 lb (58.1 kg)   12/19/17 128 lb (58.1 kg)              Today, you had the following     No orders found for display         Today's Medication Changes          These changes are accurate as of 4/4/18 12:27 PM.  If you have any questions, ask your nurse or doctor.               These medicines have changed or have updated prescriptions.        Dose/Directions    meloxicam 15 MG tablet   Commonly known as:  MOBIC   This may have changed:    - when to take this  - reasons to take this   Used for:  Greater trochanteric bursitis of left hip   Changed by:  Jeny Correia MD        Dose:  15 mg   Take 1 tablet (15 mg) by mouth daily as needed   Quantity:  30 tablet   Refills:  1       tolterodine 2 MG tablet   Commonly known as:  DETROL   This may have changed:  See the new instructions.   Used for:  Overactive bladder   Changed by:  Jeny Correia MD        Dose:  2 mg   Take 1 tablet (2 mg) by mouth 2 times daily   Quantity:  180 tablet   Refills:  3            Where to get your medicines      These medications were sent to [id number] - [place], [place] - [address]  [address] [address]    Hours:  test Rx sent successfully 12/26/02  KR Phone:  [phone]     meloxicam 15 MG tablet    tolterodine 2 MG tablet                Primary Care Provider Office Phone # Fax #    Jeny Correia [phone]       [address] [place] [address] [address]  [facility] [address]        Equal Access to Services     [facility] AH: Hadii aad ku hadasho Soomaali, waaxda luqadaha, qaybta kaalmada adeegyada, liza mendoza . So [facility] [phone].    ATENCIÓN: Si habla español, tiene a garcia disposición servicios gratuitos de asistencia lingüística. Llame al [phone].    We comply with applicable federal civil rights laws and [place] laws. We do not discriminate  on the basis of race, color, national origin, age, disability, sex, sexual orientation, or gender identity.            Thank you!     Thank you for choosing [facility]  for your care. Our goal is always to provide you with excellent care. Hearing back from our patients is one way we can continue to improve our services. Please take a few minutes to complete the written survey that you may receive in the mail after your visit with us. Thank you!             Your Updated Medication List - Protect others around you: Learn how to safely use, store and throw away your medicines at www.disposemymeds.org.          This list is accurate as of 4/4/18 12:27 PM.  Always use your most recent med list.                   Brand Name Dispense Instructions for use Diagnosis    clobetasol 0.05 % cream    TEMOVATE    60 g    Apply topically as needed On elbows    Psoriasis       fluocinonide 0.05 % solution    LIDEX    60 mL    APPLY TOPICALLY TO SCALP DAILY    Psoriasis       fluticasone 50 MCG/ACT spray    FLONASE    1 Package    Spray 2 sprays into both nostrils daily    Chronic rhinitis       meloxicam 15 MG tablet    MOBIC    30 tablet    Take 1 tablet (15 mg) by mouth daily as needed    Greater trochanteric bursitis of left hip       tolterodine 2 MG tablet    DETROL    180 tablet    Take 1 tablet (2 mg) by mouth 2 times daily    Overactive bladder

## 2018-04-04 NOTE — NURSING NOTE
"Chief Complaint   Patient presents with     Recheck Medication     Panel Management     honoring choices       Initial BP 90/52 (BP Location: Right arm, Patient Position: Chair, Cuff Size: Adult Regular)  Pulse 58  Temp 98  F (36.7  C) (Temporal)  Resp 14  Ht 5' 4.25\" (1.632 m)  Wt 124 lb (56.2 kg)  SpO2 99%  BMI 21.12 kg/m2 Estimated body mass index is 21.12 kg/(m^2) as calculated from the following:    Height as of this encounter: 5' 4.25\" (1.632 m).    Weight as of this encounter: 124 lb (56.2 kg).  Medication Reconciliation: complete   Nancy Caro CMA      "

## 2018-07-02 ENCOUNTER — TRANSFERRED RECORDS (OUTPATIENT)
Dept: HEALTH INFORMATION MANAGEMENT | Facility: CLINIC | Age: 53
End: 2018-07-02

## 2018-11-16 DIAGNOSIS — L40.9 PSORIASIS: ICD-10-CM

## 2018-11-16 RX ORDER — FLUOCINONIDE TOPICAL SOLUTION USP, 0.05% 0.5 MG/ML
SOLUTION TOPICAL
Qty: 60 ML | Refills: 0 | Status: SHIPPED | OUTPATIENT
Start: 2018-11-16 | End: 2019-05-02

## 2018-11-28 ENCOUNTER — TELEPHONE (OUTPATIENT)
Dept: FAMILY MEDICINE | Facility: OTHER | Age: 53
End: 2018-11-28

## 2018-11-28 NOTE — TELEPHONE ENCOUNTER
Summary:    Patient is due/failing the following:   MAMMOGRAM    Action needed:   Schedule a mammogram     Type of outreach:    Phone, left message for patient to call back.     Questions for provider review:    None                                                                                                                                    Zoie Gutierrez       Chart routed to Care Team .        Panel Management Review      Patient has the following on her problem list: None      Composite cancer screening  Chart review shows that this patient is due/due soon for the following Mammogram

## 2018-11-28 NOTE — LETTER
[facility]  [address]   [facility] [address]  Phone: [phone]  December 5, 2018      Brittany Corbett  [address] [facility] [address]      Dear Brittany,    We care about your health and have reviewed your health plan including your medical conditions, medications, and lab results.  Based on this review, it is recommended that you follow up regarding the following health topic(s):  -Breast Cancer Screening    We recommend you take the following action(s):  -schedule a MAMMOGRAM which is due. Please disregard this reminder if you have had this exam elsewhere within the last 1-2 years please let us know so we can update your records.     Please call us at the [facility] - [phone] (or use [facility]) to address the above recommendations.     Thank you for trusting [facility] and we appreciate the opportunity to serve you.  We look forward to supporting your healthcare needs in the future.    Healthy Regards,    Your Health Care Team  [facility]

## 2019-03-27 ENCOUNTER — TELEPHONE (OUTPATIENT)
Dept: FAMILY MEDICINE | Facility: OTHER | Age: 54
End: 2019-03-27

## 2019-04-24 DIAGNOSIS — L40.9 PSORIASIS: ICD-10-CM

## 2019-04-25 RX ORDER — FLUOCINONIDE TOPICAL SOLUTION USP, 0.05% 0.5 MG/ML
SOLUTION TOPICAL
Qty: 60 ML | Refills: 0 | OUTPATIENT
Start: 2019-04-25

## 2019-04-25 NOTE — TELEPHONE ENCOUNTER
"Requested Prescriptions   Pending Prescriptions Disp Refills     fluocinonide (LIDEX) 0.05 % external solution [Pharmacy Med Name: FLUOCINONIDE 0.05% SOLN] 60 mL 0     Sig: APPLY TOPICALLY TO SCALP DAILY       Topical Steroids and Nonsteroidals Protocol Failed - 4/24/2019  8:39 PM        Failed - High potency steroid not ordered        Failed - Recent (12 mo) or future (30 days) visit within the authorizing provider's specialty     Patient had office visit in the last 12 months or has a visit in the next 30 days with authorizing provider or within the authorizing provider's specialty.  See \"Patient Info\" tab in inbasket, or \"Choose Columns\" in Meds & Orders section of the refill encounter.              Passed - Patient is age 6 or older        Passed - Authorizing prescriber's most recent note related to this medication read.     If refill request is for ophthalmic use, please forward request to provider for approval.          Passed - Medication is active on med list        Last ov 11/16/2018    Routing refill request to provider for review/approval because:  A break in medication        "

## 2019-04-29 NOTE — PROGRESS NOTES
"  SUBJECTIVE:   Brittany Corbett is a 54 year old female who presents to clinic today for the following health issues:    History of Present Illness     Diet:  Regular (no restrictions)  Frequency of exercise:  4-5 days/week  Duration of exercise:  45-60 minutes  Taking medications regularly:  Yes  Medication side effects:  None  Additional concerns today:  No       Medication Followup of Lidex     Taking Medication as prescribed: yes    Side Effects:  None    Medication Helping Symptoms:  yes     Medication Followup of Clobetasol     Taking Medication as prescribed: yes    Side Effects:  None    Medication Helping Symptoms:  yes     Medication Followup of Detrol    Taking Medication as prescribed: yes    Side Effects:  None    Medication Helping Symptoms:  yes         Additional history: as documented    Reviewed and updated as needed this visit by clinical staff  Allergies  Meds  Problems         Reviewed and updated as needed this visit by Provider  Allergies  Meds  Problems         Labs reviewed in EPIC    ROS:  Constitutional, HEENT, cardiovascular, pulmonary, gi and gu systems are negative, except as otherwise noted.    OBJECTIVE:   /60   Pulse 64   Temp 97.8  F (36.6  C) (Temporal)   Resp 16   Ht 1.598 m (5' 2.9\")   Wt 58.5 kg (129 lb)   LMP  (LMP Unknown)   SpO2 98%   BMI 22.92 kg/m    Body mass index is 22.92 kg/m .  GENERAL APPEARANCE: healthy, alert and no distress  EYES: Eyes grossly normal to inspection, PERRLA, conjunctivae and sclerae without injection or discharge, EOM intact   RESP: Lungs clear to auscultation - no rales, rhonchi or wheezes    CV: Regular rates and rhythm, normal S1 S2, no S3 or S4, no murmur, click or rub, no peripheral edema and peripheral pulses strong and symmetric bilaterally   MS: No musculoskeletal defects are noted and gait is age appropriate without ataxia   SKIN:   Scattered psoriasis in scalp  Bilateral hands - scattered eczema spots with dry skin, " flaking, and cracking   No suspicious lesions or rashes, hydration status appears adeuqate with normal skin turgor   PSYCH: Alert and oriented x3; speech- coherent , normal rate and volume; able to articulate logical thoughts, able to abstract reason, no tangential thoughts, no hallucinations or delusions, mentation appears normal, Mood is euthymic. Affect is appropriate for this mood state and bright. Thought content is free of suicidal ideation, hallucinations, and delusions. Dress is adequate and upkept. Eye contact is good during conversation.       Diagnostic Test Results:  none     ASSESSMENT/PLAN:       ICD-10-CM    1. Psoriasis L40.9 fluocinonide (LIDEX) 0.05 % external solution     clobetasol (TEMOVATE) 0.05 % external cream   2. Eczema, unspecified type L30.9    3. Overactive bladder N32.81 tolterodine (DETROL) 2 MG tablet     1 & 2.   - Patient struggles with skin issues but maintainable with Lidex on scalp and Clobetasol   - Reviewed both use and side effects  - Also reviewed good skin care - avoiding water, using gloves, avoiding hot water, hypoallergenic cream thick lotions, etc. (patient is doing all these)     3. Stable on Detrol, reviewed use and side effects, refilled     The patient indicates understanding of these issues and agrees with the plan.    Follow up: 1 year or as needed       Camila Sanchez PA-C  [facility]

## 2019-05-02 ENCOUNTER — OFFICE VISIT (OUTPATIENT)
Dept: FAMILY MEDICINE | Facility: OTHER | Age: 54
End: 2019-05-02
Payer: COMMERCIAL

## 2019-05-02 VITALS
TEMPERATURE: 97.8 F | SYSTOLIC BLOOD PRESSURE: 104 MMHG | HEIGHT: 63 IN | WEIGHT: 129 LBS | HEART RATE: 64 BPM | RESPIRATION RATE: 16 BRPM | DIASTOLIC BLOOD PRESSURE: 60 MMHG | OXYGEN SATURATION: 98 % | BODY MASS INDEX: 22.86 KG/M2

## 2019-05-02 DIAGNOSIS — L30.9 ECZEMA, UNSPECIFIED TYPE: ICD-10-CM

## 2019-05-02 DIAGNOSIS — L40.9 PSORIASIS: Primary | ICD-10-CM

## 2019-05-02 DIAGNOSIS — N32.81 OVERACTIVE BLADDER: ICD-10-CM

## 2019-05-02 DIAGNOSIS — Z12.31 VISIT FOR SCREENING MAMMOGRAM: ICD-10-CM

## 2019-05-02 PROCEDURE — 99214 OFFICE O/P EST MOD 30 MIN: CPT | Performed by: PHYSICIAN ASSISTANT

## 2019-05-02 PROCEDURE — 77067 SCR MAMMO BI INCL CAD: CPT | Mod: TC

## 2019-05-02 RX ORDER — TOLTERODINE TARTRATE 2 MG/1
2 TABLET, EXTENDED RELEASE ORAL 2 TIMES DAILY
Qty: 180 TABLET | Refills: 3 | Status: SHIPPED | OUTPATIENT
Start: 2019-05-02 | End: 2020-05-19

## 2019-05-02 RX ORDER — FLUOCINONIDE TOPICAL SOLUTION USP, 0.05% 0.5 MG/ML
SOLUTION TOPICAL
Qty: 60 ML | Refills: 3 | Status: SHIPPED | OUTPATIENT
Start: 2019-05-02 | End: 2020-05-19

## 2019-05-02 RX ORDER — CLOBETASOL PROPIONATE 0.5 MG/G
CREAM TOPICAL PRN
Qty: 60 G | Refills: 3 | Status: SHIPPED | OUTPATIENT
Start: 2019-05-02 | End: 2020-08-07

## 2019-05-02 ASSESSMENT — MIFFLIN-ST. JEOR: SCORE: 1142.68

## 2019-05-02 ASSESSMENT — PAIN SCALES - GENERAL: PAINLEVEL: NO PAIN (0)

## 2019-07-03 ENCOUNTER — TRANSFERRED RECORDS (OUTPATIENT)
Dept: HEALTH INFORMATION MANAGEMENT | Facility: CLINIC | Age: 54
End: 2019-07-03

## 2019-07-24 NOTE — PROGRESS NOTES
[address]  [facility] [address]  [phone]  Dept: [phone]    PRE-OP EVALUATION:  Today's date: 2019    Brittany Corbett (: 1965) presents for pre-operative evaluation assessment as requested by Dr. Fernandez.  She requires evaluation and anesthesia risk assessment prior to undergoing surgery/procedure for treatment of eyes .    Fax number for surgical facility: [phone]  Primary Physician: Jeny Correia  Type of Anesthesia Anticipated: Topical     Patient has a Health Care Directive or Living Will:  Patient is not sure     Preop Questions 2019   Who is doing your surgery? Christiano Lindsay   What are you having done? Cataract removal, left eye   Date of Surgery/Procedure: Aug. 8th, 2019   Facility or Hospital where procedure/surgery will be performed: [facility]   1.  Do you have a history of Heart attack, stroke, stent, coronary bypass surgery, or other heart surgery? No   2.  Do you ever have any pain or discomfort in your chest? No   3.  Do you have a history of  Heart Failure? No   4.   Are you troubled by shortness of breath when:  walking on a level surface, or up a slight hill, or at night? No   5.  Do you currently have a cold, bronchitis or other respiratory infection? No   6.  Do you have a cough, shortness of breath, or wheezing? No   7.  Do you sometimes get pains in the calves of your legs when you walk? No   8. Do you or anyone in your family have previous history of blood clots? No   9.  Do you or does anyone in your family have a serious bleeding problem such as prolonged bleeding following surgeries or cuts? No   10. Have you ever had problems with anemia or been told to take iron pills? No   11. Have you had any abnormal blood loss such as black, tarry or bloody stools, or abnormal vaginal bleeding? No   12. Have you ever had a blood transfusion? No   13. Have you or any of your relatives ever had problems  with anesthesia? No   14. Do you have sleep apnea, excessive snoring or daytime drowsiness? No   15. Do you have any prosthetic heart valves? No   16. Do you have prosthetic joints? No   17. Is there any chance that you may be pregnant? No         HPI:     HPI related to upcoming procedure: left cataract for 1 year      See problem list for active medical problems.  Problems all longstanding and stable, except as noted/documented.  See ROS for pertinent symptoms related to these conditions.      MEDICAL HISTORY:     Patient Active Problem List    Diagnosis Date Noted     Bulging discs 01/26/2015     Priority: Medium     Psoriasis 09/23/2009     Priority: Medium     Other chronic allergic conjunctivitis 04/07/2008     Priority: Medium      Past Medical History:   Diagnosis Date     Psoriasis 9/23/2009     Past Surgical History:   Procedure Laterality Date     BIOPSY  June 2014    breast biopsy, right     C NONSPECIFIC PROCEDURE      laparoscopy for infertility     COLONOSCOPY  7/21/2014    Procedure: COLONOSCOPY;  Surgeon: Duane, William Charles, MD;  Location: [facility] OR     COLONOSCOPY       HC TOOTH EXTRACTION W/FORCEP       HEAD & NECK SURGERY       Current Outpatient Medications   Medication Sig Dispense Refill     clobetasol (TEMOVATE) 0.05 % external cream Apply topically as needed On elbows 60 g 3     fluocinonide (LIDEX) 0.05 % external solution APPLY TOPICALLY TO SCALP DAILY 60 mL 3     fluticasone (FLONASE) 50 MCG/ACT nasal spray Spray 2 sprays into both nostrils daily 1 Package 12     tolterodine (DETROL) 2 MG tablet Take 1 tablet (2 mg) by mouth 2 times daily 180 tablet 3     OTC products: None, except as noted above    Allergies   Allergen Reactions     No Known Drug Allergies       Latex Allergy: NO    Social History     Tobacco Use     Smoking status: Never Smoker     Smokeless tobacco: Never Used     Tobacco comment: no smokers in household   Substance Use Topics     Alcohol use: Yes     Comment:  alcoholic beveragesx2/1x per month     History   Drug Use No       REVIEW OF SYSTEMS:   CONSTITUTIONAL: NEGATIVE for fever, chills, change in weight  ENT/MOUTH: NEGATIVE for ear, mouth and throat problems  RESP: NEGATIVE for significant cough or shortness of breath   CV: NEGATIVE for chest pain, palpitations or peripheral edema    EXAM:   /68 (BP Location: Right arm, Patient Position: Chair, Cuff Size: Adult Regular)   Pulse 54   Temp 97.8  F (36.6  C) (Temporal)   Resp 14   Wt 57.2 kg (126 lb)   LMP  (LMP Unknown)   SpO2 98%   BMI 22.39 kg/m      GENERAL APPEARANCE: healthy, alert and no distress     EYES: EOMI, PERRL     HENT: ear canals and TM's normal and nose and mouth without ulcers or lesions     NECK: no adenopathy, no asymmetry, masses, or scars and thyroid normal to palpation     RESP: lungs clear to auscultation - no rales, rhonchi or wheezes     CV: regular rates and rhythm, normal S1 S2, no S3 or S4 and no murmur, click or rub     ABDOMEN:  soft, nontender, no HSM or masses and bowel sounds normal     MS: extremities normal- no gross deformities noted, no evidence of inflammation in joints, FROM in all extremities.     SKIN: no suspicious lesions or rashes     NEURO: Normal strength and tone, sensory exam grossly normal, mentation intact and speech normal     PSYCH: mentation appears normal. and affect normal/bright     LYMPHATICS: No cervical adenopathy    DIAGNOSTICS:   No labs or EKG required for low risk surgery (cataract, skin procedure, breast biopsy, etc)    Recent Labs   Lab Test 07/11/17  1056 06/17/14  1419   HGB 14.7 13.8    202   NA  --  141   POTASSIUM  --  3.9   CR  --  0.92        IMPRESSION:   Reason for surgery/procedure: cataract    The proposed surgical procedure is considered LOW risk.    REVISED CARDIAC RISK INDEX  The patient has the following serious cardiovascular risks for perioperative complications such as (MI, PE, VFib and 3  AV Block):  No serious cardiac  risks  INTERPRETATION: 0 risks: Class I (very low risk - 0.4% complication rate)    The patient has the following additional risks for perioperative complications:  No identified additional risks      ICD-10-CM    1. Preop general physical exam Z01.818    2. Cataract of left eye, unspecified cataract type H26.9        RECOMMENDATIONS:             APPROVAL GIVEN to proceed with proposed procedure, without further diagnostic evaluation       Signed Electronically by: Jeny Correia MD    Copy of this evaluation report is provided to requesting physician.    [facility] Preop Guidelines    Revised Cardiac Risk Index

## 2019-07-30 ENCOUNTER — OFFICE VISIT (OUTPATIENT)
Dept: FAMILY MEDICINE | Facility: OTHER | Age: 54
End: 2019-07-30
Payer: COMMERCIAL

## 2019-07-30 VITALS
RESPIRATION RATE: 14 BRPM | OXYGEN SATURATION: 98 % | BODY MASS INDEX: 22.39 KG/M2 | TEMPERATURE: 97.8 F | HEART RATE: 54 BPM | DIASTOLIC BLOOD PRESSURE: 68 MMHG | WEIGHT: 126 LBS | SYSTOLIC BLOOD PRESSURE: 112 MMHG

## 2019-07-30 DIAGNOSIS — Z01.818 PREOP GENERAL PHYSICAL EXAM: Primary | ICD-10-CM

## 2019-07-30 DIAGNOSIS — H26.9 CATARACT OF LEFT EYE, UNSPECIFIED CATARACT TYPE: ICD-10-CM

## 2019-07-30 PROCEDURE — 99214 OFFICE O/P EST MOD 30 MIN: CPT | Performed by: FAMILY MEDICINE

## 2019-12-08 ENCOUNTER — HEALTH MAINTENANCE LETTER (OUTPATIENT)
Age: 54
End: 2019-12-08

## 2020-05-19 ENCOUNTER — MYC REFILL (OUTPATIENT)
Dept: FAMILY MEDICINE | Facility: OTHER | Age: 55
End: 2020-05-19

## 2020-05-19 DIAGNOSIS — L40.9 PSORIASIS: ICD-10-CM

## 2020-05-19 DIAGNOSIS — N32.81 OVERACTIVE BLADDER: ICD-10-CM

## 2020-05-20 RX ORDER — TOLTERODINE TARTRATE 2 MG/1
2 TABLET, EXTENDED RELEASE ORAL 2 TIMES DAILY
Qty: 180 TABLET | Refills: 3 | Status: SHIPPED | OUTPATIENT
Start: 2020-05-20 | End: 2020-08-07

## 2020-05-20 RX ORDER — FLUOCINONIDE TOPICAL SOLUTION USP, 0.05% 0.5 MG/ML
SOLUTION TOPICAL
Qty: 60 ML | Refills: 0 | Status: SHIPPED | OUTPATIENT
Start: 2020-05-20 | End: 2020-08-07

## 2020-05-20 NOTE — TELEPHONE ENCOUNTER
Due for appt. Teresa given, please schedule. (Phone, ov, or evisit as appropriate).   Annelise Gaines MD

## 2020-05-20 NOTE — TELEPHONE ENCOUNTER
Routing refill request to provider for review/approval because:  Drug not on the [facility] refill protocol     Jessica Kline, RN, BSN

## 2020-07-31 DIAGNOSIS — Z12.31 SCREENING MAMMOGRAM FOR HIGH-RISK PATIENT: ICD-10-CM

## 2020-08-07 ENCOUNTER — VIRTUAL VISIT (OUTPATIENT)
Dept: FAMILY MEDICINE | Facility: OTHER | Age: 55
End: 2020-08-07
Payer: COMMERCIAL

## 2020-08-07 DIAGNOSIS — N32.81 OVERACTIVE BLADDER: ICD-10-CM

## 2020-08-07 DIAGNOSIS — L40.9 PSORIASIS: ICD-10-CM

## 2020-08-07 PROCEDURE — 99213 OFFICE O/P EST LOW 20 MIN: CPT | Mod: 95 | Performed by: FAMILY MEDICINE

## 2020-08-07 RX ORDER — TOLTERODINE TARTRATE 2 MG/1
2 TABLET, EXTENDED RELEASE ORAL 2 TIMES DAILY
Qty: 180 TABLET | Refills: 3 | Status: SHIPPED | OUTPATIENT
Start: 2020-08-07 | End: 2021-06-08

## 2020-08-07 RX ORDER — CLOBETASOL PROPIONATE 0.5 MG/G
CREAM TOPICAL PRN
Qty: 60 G | Refills: 3 | Status: SHIPPED | OUTPATIENT
Start: 2020-08-07 | End: 2021-11-04

## 2020-08-07 RX ORDER — FLUOCINONIDE TOPICAL SOLUTION USP, 0.05% 0.5 MG/ML
SOLUTION TOPICAL
Qty: 60 ML | Refills: 11 | Status: SHIPPED | OUTPATIENT
Start: 2020-08-07 | End: 2021-08-31

## 2020-08-07 NOTE — PROGRESS NOTES
"Brittany Corbett is a 55 year old female who is being evaluated via a billable telephone visit.      The patient has been notified of following:     \"This telephone visit will be conducted via a call between you and your physician/provider. We have found that certain health care needs can be provided without the need for a physical exam.  This service lets us provide the care you need with a short phone conversation.  If a prescription is necessary we can send it directly to your pharmacy.  If lab work is needed we can place an order for that and you can then stop by our lab to have the test done at a later time.    Telephone visits are billed at different rates depending on your insurance coverage. During this emergency period, for some insurers they may be billed the same as an in-person visit.  Please reach out to your insurance provider with any questions.    If during the course of the call the physician/provider feels a telephone visit is not appropriate, you will not be charged for this service.\"    Patient has given verbal consent for Telephone visit?  Yes    What phone number would you like to be contacted at? [phone]    How would you like to obtain your AVS? [facility]     Brittany Corbett is a 55 year old female who presents via phone visit today for the following health issues:    HPI    Medication Followup of all medication    Taking Medication as prescribed: yes    Side Effects:  None    Medication Helping Symptoms:  yes     For her psoriasis she uses Lidex solution for her scalp.  She feels this is effective.  She feels that 1 bottle last 1 to 2 months.  She also has flares of psoriasis on her elbows for which she uses clobetasol cream.  Usually 1 to last about a year.    She has overactive bladder.  She feels the Detrol has been helpful.  She also has a daughter who has similar issues who did not feel Detrol helped but did go through pelvic floor therapy and has been sharing exercises " with her mother.  Brittany finds these helpful.  She does not want to consider going through therapy herself at this point.    Patient Active Problem List   Diagnosis     Other chronic allergic conjunctivitis     Psoriasis     Bulging discs     Overactive bladder     Past Surgical History:   Procedure Laterality Date     BIOPSY  June 2014    breast biopsy, right     C NONSPECIFIC PROCEDURE      laparoscopy for infertility     COLONOSCOPY  7/21/2014    Procedure: COLONOSCOPY;  Surgeon: Duane, William Charles, MD;  Location: [facility] OR     COLONOSCOPY       HC TOOTH EXTRACTION W/FORCEP       HEAD & NECK SURGERY         Social History     Tobacco Use     Smoking status: Never Smoker     Smokeless tobacco: Never Used     Tobacco comment: no smokers in household   Substance Use Topics     Alcohol use: Yes     Comment: alcoholic beveragesx2/1x per month     Family History   Problem Relation Age of Onset     Diabetes Mother      Cancer Mother         multiple myeloma     Hypertension Mother      C.A.D. Father         age 55 for CABG, had second bypass as well     Lipids Father      Depression Father      Cardiovascular Father      Cancer Paternal Grandmother         cervical     Neurologic Disorder Sister         MS     Breast Cancer Sister         11/2012     Breast Cancer Maternal Aunt      Breast Cancer Other         Cousin           Reviewed and updated as needed this visit by Provider  Tobacco  Allergies  Meds  Problems  Med Hx  Surg Hx  Fam Hx         Review of Systems   CONSTITUTIONAL: NEGATIVE for fever, chills, change in weight  RESP: NEGATIVE for significant cough or shortness of breath   CV: NEGATIVE for chest pain, palpitations or peripheral edema       Objective   Reported vitals:  LMP  (LMP Unknown)    Gen: alert and no distress  PSYCH: Alert and oriented times 3; coherent speech, normal   rate and volume, able to articulate logical thoughts, able   to abstract reason, no tangential thoughts, no hallucinations    or delusions  Her affect is normal  RESP: No cough, no audible wheezing, able to talk in full sentences  Remainder of exam unable to be completed due to telephone visits          Assessment/Plan:    1. Psoriasis  Stable.  Refills given.    - fluocinonide (LIDEX) 0.05 % external solution; APPLY TOPICALLY TO SCALP DAILY  Dispense: 60 mL; Refill: 11  - clobetasol (TEMOVATE) 0.05 % external cream; Apply topically as needed On elbows  Dispense: 60 g; Refill: 3    2. Overactive bladder  Stable.  Refills given.    - tolterodine (DETROL) 2 MG tablet; Take 1 tablet (2 mg) by mouth 2 times daily  Dispense: 180 tablet; Refill: 3    Return in about 1 year (around 8/7/2021) for Routine Visit.      Phone call duration:  7 minutes    Jeny Correia MD

## 2020-09-28 ENCOUNTER — VIRTUAL VISIT (OUTPATIENT)
Dept: FAMILY MEDICINE | Facility: OTHER | Age: 55
End: 2020-09-28
Payer: COMMERCIAL

## 2020-09-28 DIAGNOSIS — R09.81 NASAL CONGESTION: Primary | ICD-10-CM

## 2020-09-28 DIAGNOSIS — R09.82 POST-NASAL DRIP: ICD-10-CM

## 2020-09-28 DIAGNOSIS — R09.81 NASAL CONGESTION: ICD-10-CM

## 2020-09-28 PROCEDURE — U0003 INFECTIOUS AGENT DETECTION BY NUCLEIC ACID (DNA OR RNA); SEVERE ACUTE RESPIRATORY SYNDROME CORONAVIRUS 2 (SARS-COV-2) (CORONAVIRUS DISEASE [COVID-19]), AMPLIFIED PROBE TECHNIQUE, MAKING USE OF HIGH THROUGHPUT TECHNOLOGIES AS DESCRIBED BY CMS-2020-01-R: HCPCS | Performed by: NURSE PRACTITIONER

## 2020-09-28 PROCEDURE — 99213 OFFICE O/P EST LOW 20 MIN: CPT | Mod: 95 | Performed by: NURSE PRACTITIONER

## 2020-09-28 NOTE — PROGRESS NOTES
"Brittany Corbett is a 55 year old female who is being evaluated via a billable telephone visit.      The patient has been notified of following:     \"This telephone visit will be conducted via a call between you and your physician/provider. We have found that certain health care needs can be provided without the need for a physical exam.  This service lets us provide the care you need with a short phone conversation.  If a prescription is necessary we can send it directly to your pharmacy.  If lab work is needed we can place an order for that and you can then stop by our lab to have the test done at a later time.    Telephone visits are billed at different rates depending on your insurance coverage. During this emergency period, for some insurers they may be billed the same as an in-person visit.  Please reach out to your insurance provider with any questions.    If during the course of the call the physician/provider feels a telephone visit is not appropriate, you will not be charged for this service.\"    Patient has given verbal consent for Telephone visit?  Yes    What phone number would you like to be contacted at? [phone]    How would you like to obtain your AVS? [facility]     Brittany Corbett is a 55 year old female who presents via phone visit today for the following health issues:    HPI    Acute Illness  Acute illness concerns:   Onset/Duration: Friday   Symptoms:  Fever: no  Chills/Sweats: no  Headache (location?): no  Sinus Pressure: no  Conjunctivitis:  no  Ear Pain: no  Rhinorrhea: no  Congestion: YES  Sore Throat: scratchy   Cough: YES - post nasal drip   Wheeze: no  Decreased Appetite: no  Nausea: no  Vomiting: no  Diarrhea: no  Dysuria/Freq.: no  Dysuria or Hematuria: no  Fatigue/Achiness: no  Sick/Strep Exposure: no  Therapies tried and outcome: none    Friday she started a scratchy throat and post nasal drip. Saturday she started coughing. Yesterday it turned into a productive " cough and today. Laryngitis today. Main complaint is cough and running nose/congestion. No fever and no chills. She works through a school.   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 Feels like a common cold.       Review of Systems   Objective          Vitals:  No vitals were obtained today due to virtual visit.    alert and no distress  PSYCH: Alert and oriented times 3; coherent speech, normal   rate and volume, able to articulate logical thoughts, able   to abstract reason, no tangential thoughts, no hallucinations   or delusions  Her affect is normal  RESP: No cough, no audible wheezing, able to talk in full sentences  Remainder of exam unable to be completed due to telephone visits    Results for orders placed or performed in visit on 09/28/20   Symptomatic COVID-19 Virus (Coronavirus) by PCR     Status: None    Specimen: Nasopharyngeal   Result Value Ref Range    COVID-19 Virus PCR to [facility] - Source Nasopharyngeal     COVID-19 Virus PCR to [facility] - Result Not Detected            Assessment/Plan:    Assessment & Plan     Nasal congestion  - Patient with nasal congestion and some post nasal drip causing coughing.   - Patient looking to get covid-19 testing given symptoms as she also works at a school  - Recommend covid-19 testing and staying home until symptoms improved.   - If any worsening symptoms after covid-19 testing recommend retesting.   - Symptomatic COVID-19 Virus (Coronavirus) by PCR; Future    Post-nasal drip  - Flonase for post nasal drip and antihistamine.   - Symptomatic COVID-19 Virus (Coronavirus) by PCR; Future       The patient indicates understanding of these issues and agrees with the plan.    There are no Patient Instructions on file for this visit.    Return in 3 days (on 10/1/2020) for If not improved.    JOSH Ferris [facility]    Phone call duration: 7  minutes

## 2020-09-29 LAB
SARS-COV-2 RNA SPEC QL NAA+PROBE: NOT DETECTED
SPECIMEN SOURCE: NORMAL

## 2020-09-29 NOTE — RESULT ENCOUNTER NOTE
Brittany, your COVID test is negative.  Please let me know if you have any questions.  Annelise Gaines MD

## 2021-01-10 ENCOUNTER — HEALTH MAINTENANCE LETTER (OUTPATIENT)
Age: 56
End: 2021-01-10

## 2021-05-26 DIAGNOSIS — N32.81 OVERACTIVE BLADDER: ICD-10-CM

## 2021-05-27 RX ORDER — TOLTERODINE TARTRATE 2 MG/1
2 TABLET, EXTENDED RELEASE ORAL 2 TIMES DAILY
Qty: 180 TABLET | Refills: 3 | OUTPATIENT
Start: 2021-05-27

## 2021-05-27 NOTE — TELEPHONE ENCOUNTER
Prescription was sent 8/7/20 for #180 with 3 refills.  Pharmacy notified via E-Prescribe refusal.     Rebecca Hodges RN on 5/27/2021 at 3:49 PM

## 2021-06-07 ENCOUNTER — MYC MEDICAL ADVICE (OUTPATIENT)
Dept: FAMILY MEDICINE | Facility: OTHER | Age: 56
End: 2021-06-07

## 2021-06-08 DIAGNOSIS — N32.81 OVERACTIVE BLADDER: ICD-10-CM

## 2021-06-08 RX ORDER — TOLTERODINE TARTRATE 2 MG/1
2 TABLET, EXTENDED RELEASE ORAL 2 TIMES DAILY
Qty: 180 TABLET | Refills: 0 | Status: SHIPPED | OUTPATIENT
Start: 2021-06-08 | End: 2021-11-04

## 2021-06-08 NOTE — TELEPHONE ENCOUNTER
RN Triage    Patient Contact    Attempt # 1    Was call answered?  No.  Left message on voicemail with information to call me back.    [employer] message sent.    Rebecca Hodges RN on 6/8/2021 at 9:25 AM

## 2021-08-19 ENCOUNTER — ANCILLARY PROCEDURE (OUTPATIENT)
Dept: MAMMOGRAPHY | Facility: OTHER | Age: 56
End: 2021-08-19
Attending: FAMILY MEDICINE
Payer: COMMERCIAL

## 2021-08-19 DIAGNOSIS — Z12.31 VISIT FOR SCREENING MAMMOGRAM: ICD-10-CM

## 2021-08-19 PROCEDURE — 77067 SCR MAMMO BI INCL CAD: CPT | Mod: TC | Performed by: RADIOLOGY

## 2021-08-19 PROCEDURE — 77063 BREAST TOMOSYNTHESIS BI: CPT | Mod: TC | Performed by: RADIOLOGY

## 2021-08-29 DIAGNOSIS — L40.9 PSORIASIS: ICD-10-CM

## 2021-08-31 RX ORDER — FLUOCINONIDE TOPICAL SOLUTION USP, 0.05% 0.5 MG/ML
SOLUTION TOPICAL
Qty: 60 ML | Refills: 0 | Status: SHIPPED | OUTPATIENT
Start: 2021-08-31 | End: 2021-11-04

## 2021-08-31 NOTE — TELEPHONE ENCOUNTER
Pending Prescriptions:                       Disp   Refills    fluocinonide (LIDEX) 0.05 % external solut*60 mL  11       Sig: APPLY TOPICALLY TO THE SCALP ONCE DAILY AS DIRECTED    Routing refill request to provider for review/approval because:  Topical Steroids and Nonsteroidals Protocol [id number]/29/2021 06:24 PM   High potency steroid not ordered

## 2021-11-03 NOTE — PATIENT INSTRUCTIONS
- On Hands      Clobetasol cream twice a day (morning and after work)      At night - light layer antibiotic ointment on open areas, then thick layer of lotion (tub lotion, hypoallergenic) followed by gloves     - Replens lubricant      Nightly for 2 weeks, then find maintenance dose (twice a week to once a month)         Check with insurance for coverage of the new shingles vaccine, Shingrix (ask them how old do I need to be and where should I get it done pharmacy or clinic). If it is not covered now, it may be covered later in the year. Shingrix is given in a 2 shot series, between 2 and 6 months apart. It is recommended for adults age 50 and older. Initial studies have indicated that this new vaccine is 85-90% effective at preventing shingles, and is preferred over the old Zostavax vaccine.         Preventive Health Recommendations  Female Ages 50 - 64    Yearly exam: See your health care provider every year in order to  o Review health changes.   o Discuss preventive care.    o Review your medicines if your doctor has prescribed any.      Get a Pap test every three years (unless you have an abnormal result and your provider advises testing more often).    If you get Pap tests with HPV test, you only need to test every 5 years, unless you have an abnormal result.     You do not need a Pap test if your uterus was removed (hysterectomy) and you have not had cancer.    You should be tested each year for STDs (sexually transmitted diseases) if you're at risk.     Have a mammogram every 1 to 2 years.    Have a colonoscopy at age 50, or have a yearly FIT test (stool test). These exams screen for colon cancer.      Have a cholesterol test every 5 years, or more often if advised.    Have a diabetes test (fasting glucose) every three years. If you are at risk for diabetes, you should have this test more often.     If you are at risk for osteoporosis (brittle bone disease), think about having a bone density scan  (DEXA).    Shots: Get a flu shot each year. Get a tetanus shot every 10 years.    Nutrition:     Eat at least 5 servings of fruits and vegetables each day.    Eat whole-grain bread, whole-wheat pasta and brown rice instead of white grains and rice.    Get adequate Calcium and Vitamin D.     Lifestyle    Exercise at least 150 minutes a week (30 minutes a day, 5 days a week). This will help you control your weight and prevent disease.    Limit alcohol to one drink per day.    No smoking.     Wear sunscreen to prevent skin cancer.     See your dentist every six months for an exam and cleaning.    See your eye doctor every 1 to 2 years.

## 2021-11-03 NOTE — PROGRESS NOTES
SUBJECTIVE:   CC: Brittany Corbett is an 56 year old woman who presents for preventive health visit.       Patient has been advised of split billing requirements and indicates understanding: Yes     Healthy Habits:     Getting at least 3 servings of Calcium per day:  Yes    Bi-annual eye exam:  Yes    Dental care twice a year:  Yes    Sleep apnea or symptoms of sleep apnea:  None    Diet:  Regular (no restrictions)    Frequency of exercise:  4-5 days/week    Duration of exercise:  30-45 minutes    Taking medications regularly:  Yes    Medication side effects:  None    PHQ-2 Total Score: 0    1. Refill medications     2. Rash on hands not getting better several months   - Tried many things   - Essential oils, lotions, cream, antibiotic ointment         Today's PHQ-2 Score:   PHQ-2 ( 1999 Pfizer) 11/4/2021   Q1: Little interest or pleasure in doing things 0   Q2: Feeling down, depressed or hopeless 0   PHQ-2 Score 0   Q1: Little interest or pleasure in doing things Not at all   Q2: Feeling down, depressed or hopeless Not at all   PHQ-2 Score 0       Abuse: Current or Past (Physical, Sexual or Emotional) - No  Do you feel safe in your environment? Yes    Have you ever done Advance Care Planning? (For example, a Health Directive, POLST, or a discussion with a medical provider or your loved ones about your wishes): Yes, patient states has an Advance Care Planning document and will bring a copy to the clinic.    Social History     Tobacco Use     Smoking status: Never Smoker     Smokeless tobacco: Never Used     Tobacco comment: no smokers in household   Substance Use Topics     Alcohol use: Yes     Comment: alcoholic beveragesx2/1x per month         Alcohol Use 11/4/2021   Prescreen: >3 drinks/day or >7 drinks/week? No   Prescreen: >3 drinks/day or >7 drinks/week? -       Reviewed orders with patient.  Reviewed health maintenance and updated orders accordingly - Yes  Lab work is in process  Labs reviewed in EPIC  BP  Readings from Last 3 Encounters:   11/04/21 117/75   07/30/19 112/68   05/02/19 104/60    Wt Readings from Last 3 Encounters:   11/04/21 55 kg (121 lb 3.2 oz)   07/30/19 57.2 kg (126 lb)   05/02/19 58.5 kg (129 lb)                    Breast Cancer Screening:  Any new diagnosis of family breast, ovarian, or bowel cancer? No    FHS-7:   Breast CA Risk Assessment (FHS-7) 8/19/2021 11/4/2021   Did any of your first-degree relatives have breast or ovarian cancer? Yes Yes   Did any of your relatives have bilateral breast cancer? No No   Did any man in your family have breast cancer? No No   Did any woman in your family have breast and ovarian cancer? No Yes   Did any woman in your family have breast cancer before age 50 y? Yes Yes   Do you have 2 or more relatives with breast and/or ovarian cancer? No No   Do you have 2 or more relatives with breast and/or bowel cancer? No No       Mammogram Screening: Recommended mammography every 1-2 years with patient discussion and risk factor consideration  Pertinent mammograms are reviewed under the imaging tab.    History of abnormal Pap smear: NO - age 30-65 PAP every 5 years with negative HPV co-testing recommended  PAP / HPV Latest Ref Rng & Units 8/9/2016 4/29/2013 9/23/2009   PAP (Historical) - NIL NIL NIL   HPV16 NEG Negative - -   HPV18 NEG Negative - -   HRHPV NEG Negative - -     Reviewed and updated as needed this visit by clinical staff  Tobacco  Allergies  Meds   Med Hx  Surg Hx  Fam Hx  Soc Hx        Reviewed and updated as needed this visit by Provider                Past Medical History:   Diagnosis Date     Psoriasis 9/23/2009      Past Surgical History:   Procedure Laterality Date     BIOPSY  June 2014    breast biopsy, right     COLONOSCOPY  7/21/2014    Procedure: COLONOSCOPY;  Surgeon: Duane, William Charles, MD;  Location: [facility] OR     COLONOSCOPY       HC TOOTH EXTRACTION W/FORCEP       HEAD & NECK SURGERY       ZZC NONSPECIFIC PROCEDURE      laparoscopy  "for infertility       Review of Systems   Constitutional: Negative for chills and fever.   HENT: Negative for congestion, ear pain, hearing loss and sore throat.    Eyes: Negative for pain and visual disturbance.   Respiratory: Negative for cough and shortness of breath.    Cardiovascular: Negative for chest pain, palpitations and peripheral edema.   Gastrointestinal: Negative for abdominal pain, constipation, diarrhea, heartburn, hematochezia and nausea.   Breasts:  Negative for tenderness, breast mass and discharge.   Genitourinary: Negative for dysuria, frequency, genital sores, hematuria, pelvic pain, urgency, vaginal bleeding and vaginal discharge.   Musculoskeletal: Negative for arthralgias, joint swelling and myalgias.   Skin: Negative for rash.   Neurological: Negative for dizziness, weakness, headaches and paresthesias.   Psychiatric/Behavioral: Negative for mood changes. The patient is not nervous/anxious.           OBJECTIVE:   /75   Pulse 64   Temp 98.1  F (36.7  C) (Oral)   Ht 1.604 m (5' 3.15\")   Wt 55 kg (121 lb 3.2 oz)   LMP  (LMP Unknown)   SpO2 97%   BMI 21.37 kg/m    Physical Exam  Constitutional:       General: She is not in acute distress.     Appearance: She is well-developed.   HENT:      Right Ear: External ear normal.      Left Ear: External ear normal.      Nose: Nose normal.      Mouth/Throat:      Pharynx: No oropharyngeal exudate.   Eyes:      General:         Right eye: No discharge.         Left eye: No discharge.      Conjunctiva/sclera: Conjunctivae normal.      Pupils: Pupils are equal, round, and reactive to light.   Neck:      Thyroid: No thyromegaly.      Vascular: No JVD.      Trachea: No tracheal deviation.   Cardiovascular:      Rate and Rhythm: Normal rate and regular rhythm.      Heart sounds: Normal heart sounds. No murmur heard.   No friction rub. No gallop.    Pulmonary:      Effort: Pulmonary effort is normal. No respiratory distress.      Breath sounds: " Normal breath sounds. No stridor. No wheezing or rales.   Chest:      Breasts: Breasts are symmetrical.         Right: No inverted nipple, mass, nipple discharge or skin change.         Left: No inverted nipple, mass, nipple discharge or skin change.   Abdominal:      General: Bowel sounds are normal. There is no distension.      Palpations: Abdomen is soft. There is no mass.      Tenderness: There is no abdominal tenderness. There is no guarding or rebound.      Hernia: No hernia is present.   Genitourinary:     Vagina: Normal. No vaginal discharge.      Cervix: No cervical motion tenderness or discharge.      Adnexa:         Right: No mass, tenderness or fullness.          Left: No mass, tenderness or fullness.     Musculoskeletal:         General: Normal range of motion.      Cervical back: Normal range of motion and neck supple.   Lymphadenopathy:      Cervical: No cervical adenopathy.   Skin:     General: Skin is warm and dry.          Neurological:      Mental Status: She is alert and oriented to person, place, and time.   Psychiatric:         Behavior: Behavior normal.         Thought Content: Thought content normal.         Judgment: Judgment normal.           Diagnostic Test Results:  Labs reviewed in Epic  See orders pending in Epic     ASSESSMENT/PLAN:       ICD-10-CM    1. Screening for diabetes mellitus  Z13.1 GLUCOSE     GLUCOSE   2. Routine general medical examination at a health care facility  Z00.00    3. Screening for HIV (human immunodeficiency virus)  Z11.4    4. Screening for hyperlipidemia  Z13.220 Lipid panel reflex to direct LDL Fasting     Lipid panel reflex to direct LDL Fasting   5. Overactive bladder  N32.81 tolterodine (DETROL) 2 MG tablet   6. Chronic rhinitis  J31.0    7. Psoriasis  L40.9 fluocinonide (LIDEX) 0.05 % external solution     clobetasol (TEMOVATE) 0.05 % external cream   8. Screening for malignant neoplasm of cervix  Z12.4 Pap Screen with HPV - recommended age 30 - 65 years  "    - Due for fasting lab check, will do today   - Results will be communicated to patient when available and appropriate medication changes made if necessary       - Reviewed medication use and side effects, refilled     - Hands      Suspect eczema that is just continued irritation so not healing     Recommend no products that aren't hypoallergenic   - On Hands      Clobetasol cream twice a day (morning and after work)      At night - light layer antibiotic ointment on open areas, then thick layer of lotion (tub lotion, hypoallergenic) followed by gloves       - Replens lubricant      Nightly for 2 weeks, then find maintenance dose (twice a week to once a month)         Patient has been advised of split billing requirements and indicates understanding: Yes  COUNSELING:  Reviewed preventive health counseling, as reflected in patient instructions  Special attention given to:        Regular exercise       Healthy diet/nutrition       Immunizations    Declined: Zoster due to Other getting covid booster today, will get this another time, information given            Consider Hep C screening for all patients one time for ages 18-79 years       HIV screeninx in teen years, 1x in adult years, and at intervals if high risk       Advance Care Planning      Perimenopausal symptom management - advised on Replens lubricant     Estimated body mass index is 21.37 kg/m  as calculated from the following:    Height as of this encounter: 1.604 m (5' 3.15\").    Weight as of this encounter: 55 kg (121 lb 3.2 oz).    She reports that she has never smoked. She has never used smokeless tobacco.      Counseling Resources:  ATP IV Guidelines  Pooled Cohorts Equation Calculator  Breast Cancer Risk Calculator  BRCA-Related Cancer Risk Assessment: FHS-7 Tool  FRAX Risk Assessment  ICSI Preventive Guidelines  Dietary Guidelines for Americans, 2010  USDA's MyPlate  ASA Prophylaxis  Lung CA Screening      Review of the result(s) of each " unique test - See list       8/31/16 - Hep C, Glucose, Lipid, PAP   Diagnosis or treatment significantly limited by social determinants of health - None     40 minutes spent on the date of the encounter doing chart review, history and exam, documentation and further activities as noted above    The patient indicates understanding of these issues and agrees with the plan.    Follow up: 1 year     Camila Sanchez PA-C  [facility]

## 2021-11-04 ENCOUNTER — OFFICE VISIT (OUTPATIENT)
Dept: FAMILY MEDICINE | Facility: OTHER | Age: 56
End: 2021-11-04
Payer: COMMERCIAL

## 2021-11-04 VITALS
BODY MASS INDEX: 21.48 KG/M2 | WEIGHT: 121.2 LBS | TEMPERATURE: 98.1 F | RESPIRATION RATE: 20 BRPM | SYSTOLIC BLOOD PRESSURE: 117 MMHG | DIASTOLIC BLOOD PRESSURE: 75 MMHG | HEIGHT: 63 IN | HEART RATE: 64 BPM | OXYGEN SATURATION: 97 %

## 2021-11-04 DIAGNOSIS — Z12.4 SCREENING FOR MALIGNANT NEOPLASM OF CERVIX: ICD-10-CM

## 2021-11-04 DIAGNOSIS — N32.81 OVERACTIVE BLADDER: ICD-10-CM

## 2021-11-04 DIAGNOSIS — Z13.220 SCREENING FOR HYPERLIPIDEMIA: ICD-10-CM

## 2021-11-04 DIAGNOSIS — Z00.00 ROUTINE GENERAL MEDICAL EXAMINATION AT A HEALTH CARE FACILITY: ICD-10-CM

## 2021-11-04 DIAGNOSIS — J31.0 CHRONIC RHINITIS: ICD-10-CM

## 2021-11-04 DIAGNOSIS — L40.9 PSORIASIS: ICD-10-CM

## 2021-11-04 DIAGNOSIS — Z13.1 SCREENING FOR DIABETES MELLITUS: Primary | ICD-10-CM

## 2021-11-04 DIAGNOSIS — Z11.4 SCREENING FOR HIV (HUMAN IMMUNODEFICIENCY VIRUS): ICD-10-CM

## 2021-11-04 LAB
CHOLEST SERPL-MCNC: 184 MG/DL
FASTING STATUS PATIENT QL REPORTED: YES
FASTING STATUS PATIENT QL REPORTED: YES
GLUCOSE BLD-MCNC: 99 MG/DL (ref 70–99)
HDLC SERPL-MCNC: 98 MG/DL
LDLC SERPL CALC-MCNC: 79 MG/DL
NONHDLC SERPL-MCNC: 86 MG/DL
TRIGL SERPL-MCNC: 36 MG/DL

## 2021-11-04 PROCEDURE — 87624 HPV HI-RISK TYP POOLED RSLT: CPT | Performed by: PHYSICIAN ASSISTANT

## 2021-11-04 PROCEDURE — 82947 ASSAY GLUCOSE BLOOD QUANT: CPT | Performed by: PHYSICIAN ASSISTANT

## 2021-11-04 PROCEDURE — 99213 OFFICE O/P EST LOW 20 MIN: CPT | Mod: 25 | Performed by: PHYSICIAN ASSISTANT

## 2021-11-04 PROCEDURE — 99386 PREV VISIT NEW AGE 40-64: CPT | Performed by: PHYSICIAN ASSISTANT

## 2021-11-04 PROCEDURE — 36415 COLL VENOUS BLD VENIPUNCTURE: CPT | Performed by: PHYSICIAN ASSISTANT

## 2021-11-04 PROCEDURE — 80061 LIPID PANEL: CPT | Performed by: PHYSICIAN ASSISTANT

## 2021-11-04 PROCEDURE — G0145 SCR C/V CYTO,THINLAYER,RESCR: HCPCS | Performed by: PHYSICIAN ASSISTANT

## 2021-11-04 RX ORDER — TOLTERODINE TARTRATE 2 MG/1
2 TABLET, EXTENDED RELEASE ORAL 2 TIMES DAILY
Qty: 180 TABLET | Refills: 3 | Status: SHIPPED | OUTPATIENT
Start: 2021-11-04 | End: 2022-11-29

## 2021-11-04 RX ORDER — CLOBETASOL PROPIONATE 0.5 MG/G
CREAM TOPICAL 2 TIMES DAILY PRN
Qty: 60 G | Refills: 3 | Status: SHIPPED | OUTPATIENT
Start: 2021-11-04 | End: 2023-07-18

## 2021-11-04 RX ORDER — FLUOCINONIDE TOPICAL SOLUTION USP, 0.05% 0.5 MG/ML
SOLUTION TOPICAL
Qty: 60 ML | Refills: 11 | Status: SHIPPED | OUTPATIENT
Start: 2021-11-04 | End: 2023-03-22

## 2021-11-04 ASSESSMENT — ENCOUNTER SYMPTOMS
HEMATURIA: 0
CHILLS: 0
DYSURIA: 0
PALPITATIONS: 0
ABDOMINAL PAIN: 0
HEADACHES: 0
COUGH: 0
DIARRHEA: 0
DIZZINESS: 0
ARTHRALGIAS: 0
NERVOUS/ANXIOUS: 0
FEVER: 0
PARESTHESIAS: 0
NAUSEA: 0
MYALGIAS: 0
SORE THROAT: 0
EYE PAIN: 0
BREAST MASS: 0
CONSTIPATION: 0
JOINT SWELLING: 0
HEMATOCHEZIA: 0
FREQUENCY: 0
HEARTBURN: 0
WEAKNESS: 0
SHORTNESS OF BREATH: 0

## 2021-11-04 ASSESSMENT — MIFFLIN-ST. JEOR: SCORE: 1101.27

## 2021-11-04 ASSESSMENT — PAIN SCALES - GENERAL: PAINLEVEL: NO PAIN (0)

## 2021-11-04 NOTE — RESULT ENCOUNTER NOTE
Margarito Soto    Your results were normal.     The results are attached for your review.       Kevan Sanchez PA-C

## 2021-11-08 LAB
BKR LAB AP GYN ADEQUACY: NORMAL
BKR LAB AP GYN INTERPRETATION: NORMAL
BKR LAB AP HPV REFLEX: NORMAL
BKR LAB AP PREVIOUS ABNORMAL: NORMAL
PATH REPORT.COMMENTS IMP SPEC: NORMAL
PATH REPORT.RELEVANT HX SPEC: NORMAL

## 2021-11-10 LAB
HUMAN PAPILLOMA VIRUS 16 DNA: NEGATIVE
HUMAN PAPILLOMA VIRUS 18 DNA: NEGATIVE
HUMAN PAPILLOMA VIRUS FINAL DIAGNOSIS: NORMAL
HUMAN PAPILLOMA VIRUS OTHER HR: NEGATIVE

## 2022-07-05 ENCOUNTER — OFFICE VISIT (OUTPATIENT)
Dept: FAMILY MEDICINE | Facility: OTHER | Age: 57
End: 2022-07-05
Payer: COMMERCIAL

## 2022-07-05 VITALS
WEIGHT: 121.4 LBS | DIASTOLIC BLOOD PRESSURE: 58 MMHG | HEART RATE: 61 BPM | OXYGEN SATURATION: 96 % | BODY MASS INDEX: 21.51 KG/M2 | HEIGHT: 63 IN | TEMPERATURE: 98.1 F | SYSTOLIC BLOOD PRESSURE: 93 MMHG

## 2022-07-05 DIAGNOSIS — H26.9 CATARACT OF BOTH EYES, UNSPECIFIED CATARACT TYPE: ICD-10-CM

## 2022-07-05 DIAGNOSIS — N32.81 OVERACTIVE BLADDER: ICD-10-CM

## 2022-07-05 DIAGNOSIS — L40.9 PSORIASIS: ICD-10-CM

## 2022-07-05 DIAGNOSIS — Z01.818 PREOP GENERAL PHYSICAL EXAM: Primary | ICD-10-CM

## 2022-07-05 PROCEDURE — 99214 OFFICE O/P EST MOD 30 MIN: CPT | Performed by: PHYSICIAN ASSISTANT

## 2022-07-05 ASSESSMENT — PAIN SCALES - GENERAL: PAINLEVEL: NO PAIN (0)

## 2022-07-05 NOTE — PROGRESS NOTES
[address]  [facility] [address]  Phone: [phone]  Primary Provider: Jeny Correia  Pre-op Performing Provider: CARLINE SANCHEZ      PREOPERATIVE EVALUATION:  Today's date: 7/5/2022    Brittany Corbett is a 57 year old female who presents for a preoperative evaluation.    Surgical Information:  Surgery/Procedure: Cataracts  [place] [facility] [place]geon:   Surgery Date: 7/14/22  Time of Surgery: TBD  Where patient plans to recover: At home with family  Fax number for surgical facility: [phone]    Type of Anesthesia Anticipated: Local    Assessment & Plan     The proposed surgical procedure is considered LOW risk.      ICD-10-CM    1. Preop general physical exam  Z01.818    2. Cataract of both eyes, unspecified cataract type  H26.9    3. Overactive bladder  N32.81    4. Psoriasis  L40.9         Risks and Recommendations:  The patient has the following additional risks and recommendations for perioperative complications:   - No identified additional risk factors other than previously addressed    Medication Instructions:  Patient is to take all scheduled medications on the day of surgery    RECOMMENDATION:  APPROVAL GIVEN to proceed with proposed procedure, without further diagnostic evaluation.    Review of the result(s) of each unique test - None  Diagnosis or treatment significantly limited by social determinants of health - None     20 minutes spent on the date of the encounter doing chart review, history and exam, documentation and further activities per the note      Signed Electronically by: Carline Sanchez PA-C  Copy of this evaluation report is provided to requesting physician.          Subjective     HPI related to upcoming procedure: Patient with cataracts significantly impacting vision, has already had one eye done, now presenting for other eye       Preop Questions 6/28/2022   1.  Have you ever had a heart attack or stroke? No   2. Have you ever had surgery on your heart or blood vessels, such as a stent placement, a coronary artery bypass, or surgery on an artery in your head, neck, heart, or legs? No   3. Do you have chest pain with activity? No   4. Do you have a history of  heart failure? No   5. Do you currently have a cold, bronchitis or symptoms of other infection? No   6. Do you have a cough, shortness of breath, or wheezing? No   7. Do you or anyone in your family have previous history of blood clots? No   8. Do you or does anyone in your family have a serious bleeding problem such as prolonged bleeding following surgeries or cuts? No   9. Have you ever had problems with anemia or been told to take iron pills? No   10. Have you had any abnormal blood loss such as black, tarry or bloody stools, or abnormal vaginal bleeding? No   11. Have you ever had a blood transfusion? No   12. Are you willing to have a blood transfusion if it is medically needed before, during, or after your surgery? Yes   13. Have you or any of your relatives ever had problems with anesthesia? No   14. Do you have sleep apnea, excessive snoring or daytime drowsiness? No   15. Do you have any artifical heart valves or other implanted medical devices like a pacemaker, defibrillator, or continuous glucose monitor? No   16. Do you have artificial joints? No   17. Are you allergic to latex? No   18. Is there any chance that you may be pregnant? No       Health Care Directive:  Patient does not have a Health Care Directive or Living Will: Discussed advance care planning with patient; however, patient declined at this time.    Preoperative Review of :   reviewed - no record of controlled substances prescribed.      Status of Chronic Conditions:  See problem list for active medical problems.  Problems all longstanding and stable, except as noted/documented.  See ROS for pertinent symptoms related to these  conditions.      Review of Systems  Constitutional, neuro, ENT, endocrine, pulmonary, cardiac, gastrointestinal, genitourinary, musculoskeletal, integument and psychiatric systems are negative, except as otherwise noted.    Patient Active Problem List    Diagnosis Date Noted     Overactive bladder 08/07/2020     Priority: Medium     Bulging discs 01/26/2015     Priority: Medium     Psoriasis 09/23/2009     Priority: Medium     Other chronic allergic conjunctivitis 04/07/2008     Priority: Medium      Past Medical History:   Diagnosis Date     Psoriasis 9/23/2009     Past Surgical History:   Procedure Laterality Date     BIOPSY  June 2014    breast biopsy, right     COLONOSCOPY  7/21/2014    Procedure: COLONOSCOPY;  Surgeon: Duane, William Charles, MD;  Location: [facility] OR     COLONOSCOPY       HC TOOTH EXTRACTION W/FORCEP       HEAD & NECK SURGERY       ZZC NONSPECIFIC PROCEDURE      laparoscopy for infertility     Current Outpatient Medications   Medication Sig Dispense Refill     clobetasol (TEMOVATE) 0.05 % external cream Apply topically 2 times daily as needed (rash) 60 g 3     fluocinonide (LIDEX) 0.05 % external solution APPLY TOPICALLY TO THE SCALP ONCE DAILY AS DIRECTED 60 mL 11     fluticasone (FLONASE) 50 MCG/ACT nasal spray Spray 2 sprays into both nostrils daily 1 Package 12     tolterodine (DETROL) 2 MG tablet Take 1 tablet (2 mg) by mouth 2 times daily 180 tablet 3       Allergies   Allergen Reactions     No Known Drug Allergies         Social History     Tobacco Use     Smoking status: Never Smoker     Smokeless tobacco: Never Used     Tobacco comment: no smokers in household   Substance Use Topics     Alcohol use: Yes     Comment: alcoholic beveragesx2/1x per month     Family History   Problem Relation Age of Onset     Diabetes Mother      Cancer Mother         multiple myeloma     Hypertension Mother      C.A.D. Father         age 55 for CABG, had second bypass as well     Lipids Father      Depression  "Father      Cardiovascular Father      Cancer Paternal Grandmother         cervical     Neurologic Disorder Sister         MS     Breast Cancer Sister         11/2012     Breast Cancer Maternal Aunt      Breast Cancer Other         Cousin     History   Drug Use No         Objective     BP 93/58 (Cuff Size: Adult Regular)   Pulse 61   Temp 98.1  F (36.7  C) (Oral)   Ht 1.595 m (5' 2.8\")   Wt 55.1 kg (121 lb 6.4 oz)   LMP  (LMP Unknown)   SpO2 96%   BMI 21.65 kg/m      Physical Exam    GENERAL APPEARANCE: healthy, alert and no distress     EYES: EOMI, PERRL     HENT: ear canals and TM's normal and nose and mouth without ulcers or lesions     NECK: no adenopathy, no asymmetry, masses, or scars and thyroid normal to palpation     RESP: lungs clear to auscultation - no rales, rhonchi or wheezes     CV: regular rates and rhythm, normal S1 S2, no S3 or S4 and no murmur, click or rub     MS: extremities normal- no gross deformities noted, no evidence of inflammation in joints, FROM in all extremities.     SKIN: no suspicious lesions or rashes     NEURO: Normal strength and tone, sensory exam grossly normal, mentation intact and speech normal     PSYCH: mentation appears normal. and affect normal/bright     LYMPHATICS: No cervical adenopathy    No results for input(s): HGB, PLT, INR, NA, POTASSIUM, CR, A1C in the last 13361 hours.     Diagnostics:  No labs were ordered during this visit.   No EKG required for low risk surgery (cataract, skin procedure, breast biopsy, etc).    Revised Cardiac Risk Index (RCRI):  The patient has the following serious cardiovascular risks for perioperative complications:   - No serious cardiac risks = 0 points     RCRI Interpretation: 0 points: Class I (very low risk - 0.4% complication rate)        "

## 2022-07-07 ENCOUNTER — TELEPHONE (OUTPATIENT)
Dept: FAMILY MEDICINE | Facility: OTHER | Age: 57
End: 2022-07-07

## 2022-07-07 NOTE — PATIENT INSTRUCTIONS
Preparing for Your Surgery  Getting started  A nurse will call you to review your health history and instructions. They will give you an arrival time based on your scheduled surgery time. Please be ready to share:    Your doctor's clinic name and phone number    Your medical, surgical and anesthesia history    A list of allergies and sensitivities    A list of medicines, including herbal treatments and over-the-counter drugs    Whether the patient has a legal guardian (ask how to send us the papers in advance)  Please tell us if you're pregnant--or if there's any chance you might be pregnant. Some surgeries may injure a fetus (unborn baby), so they require a pregnancy test. Surgeries that are safe for a fetus don't always need a test, and you can choose whether to have one.   If you have a child who's having surgery, please ask for a copy of Preparing for Your Child's Surgery.    Preparing for surgery    Within 30 days of surgery: Have a pre-op exam (sometimes called an H&P, or History and Physical). This can be done at a clinic or pre-operative center.  ? If you're having a , you may not need this exam. Talk to your care team.    At your pre-op exam, talk to your care team about all medicines you take. If you need to stop any medicines before surgery, ask when to start taking them again.  ? We do this for your safety. Many medicines can make you bleed too much during surgery. Some change how well surgery (anesthesia) drugs work.    Call your insurance company to let them know you're having surgery. (If you don't have insurance, call [phone].)    Call your clinic if there's any change in your health. This includes signs of a cold or flu (sore throat, runny nose, cough, rash, fever). It also includes a scrape or scratch near the surgery site.    If you have questions on the day of surgery, call your hospital or surgery center.  COVID testing  You may need to be tested for COVID-19 before having  surgery. If so, we will give you instructions.  Eating and drinking guidelines  For your safety: Unless your surgeon tells you otherwise, follow the guidelines below.    Eat and drink as usual until 8 hours before surgery. After that, no food or milk.    Drink clear liquids until 2 hours before surgery. These are liquids you can see through, like water, Gatorade and Propel Water. You may also have black coffee and tea (no cream or milk).    Nothing by mouth within 2 hours of surgery. This includes gum, candy and breath mints.    If you drink alcohol: Stop drinking it the night before surgery.    If your care team tells you to take medicine on the morning of surgery, it's okay to take it with a sip of water.  Preventing infection    Shower or bathe the night before and morning of your surgery. Follow the instructions your clinic gave you. (If no instructions, use regular soap.)    Don't shave or clip hair near your surgery site. We'll remove the hair if needed.    Don't smoke or vape the morning of surgery. You may chew nicotine gum up to 2 hours before surgery. A nicotine patch is okay.  ? Note: Some surgeries require you to completely quit smoking and nicotine. Check with your surgeon.    Your care team will make every effort to keep you safe from infection. We will:  ? Clean our hands often with soap and water (or an alcohol-based hand rub).  ? Clean the skin at your surgery site with a special soap that kills germs.  ? Give you a special gown to keep you warm. (Cold raises the risk of infection.)  ? Wear special hair covers, masks, gowns and gloves during surgery.  ? Give antibiotic medicine, if prescribed. Not all surgeries need antibiotics.  What to bring on the day of surgery    Photo ID and insurance card    Copy of your health care directive, if you have one    Glasses and hearing aides (bring cases)  ? You can't wear contacts during surgery    Inhaler and eye drops, if you use them (tell us about these when  you arrive)    CPAP machine or breathing device, if you use them    A few personal items, if spending the night    If you have . . .  ? A pacemaker, ICD (cardiac defibrillator) or other implant: Bring the ID card.  ? An implanted stimulator: Bring the remote control.  ? A legal guardian: Bring a copy of the certified (court-stamped) guardianship papers.  Please remove any jewelry, including body piercings. Leave jewelry and other valuables at home.  If you're going home the day of surgery    You must have a responsible adult drive you home. They should stay with you overnight as well.    If you don't have someone to stay with you, and you aren't safe to go home alone, we may keep you overnight. Insurance often won't pay for this.  After surgery  If it's hard to control your pain or you need more pain medicine, please call your surgeon's office.  Questions?   If you have any questions for your care team, list them here: _________________________________________________________________________________________________________________________________________________________________________ ____________________________________ ____________________________________ ____________________________________  For informational purposes only. Not to replace the advice of your health care provider. Copyright   2003, 2019 [facility]. All rights reserved. Clinically reviewed by Eliz Rogers MD. [employer] [id number] - REV 07/21.

## 2022-07-07 NOTE — TELEPHONE ENCOUNTER
Please fax pre-op    Kevan Parr-JOY Sims  [facility] [place]

## 2022-10-04 ENCOUNTER — OFFICE VISIT (OUTPATIENT)
Dept: FAMILY MEDICINE | Facility: OTHER | Age: 57
End: 2022-10-04
Payer: COMMERCIAL

## 2022-10-04 VITALS
SYSTOLIC BLOOD PRESSURE: 106 MMHG | DIASTOLIC BLOOD PRESSURE: 60 MMHG | WEIGHT: 122.5 LBS | TEMPERATURE: 96.3 F | HEIGHT: 63 IN | BODY MASS INDEX: 21.71 KG/M2 | OXYGEN SATURATION: 96 % | HEART RATE: 60 BPM | RESPIRATION RATE: 18 BRPM

## 2022-10-04 DIAGNOSIS — Z01.818 PREOP GENERAL PHYSICAL EXAM: Primary | ICD-10-CM

## 2022-10-04 DIAGNOSIS — H25.9 SENILE CATARACT OF RIGHT EYE, UNSPECIFIED AGE-RELATED CATARACT TYPE: ICD-10-CM

## 2022-10-04 DIAGNOSIS — N32.81 OVERACTIVE BLADDER: ICD-10-CM

## 2022-10-04 PROCEDURE — 90682 RIV4 VACC RECOMBINANT DNA IM: CPT | Performed by: PHYSICIAN ASSISTANT

## 2022-10-04 PROCEDURE — 99213 OFFICE O/P EST LOW 20 MIN: CPT | Mod: 25 | Performed by: PHYSICIAN ASSISTANT

## 2022-10-04 PROCEDURE — 90471 IMMUNIZATION ADMIN: CPT | Performed by: PHYSICIAN ASSISTANT

## 2022-10-04 RX ORDER — MOXIFLOXACIN 5 MG/ML
SOLUTION/ DROPS OPHTHALMIC
COMMUNITY
Start: 2022-05-27 | End: 2023-07-18

## 2022-10-04 RX ORDER — PREDNISOLONE ACETATE 10 MG/ML
SUSPENSION/ DROPS OPHTHALMIC
COMMUNITY
Start: 2022-05-27 | End: 2023-07-18

## 2022-10-04 ASSESSMENT — PAIN SCALES - GENERAL: PAINLEVEL: NO PAIN (0)

## 2022-10-04 NOTE — PATIENT INSTRUCTIONS
Preparing for Your Surgery  Getting started  A nurse will call you to review your health history and instructions. They will give you an arrival time based on your scheduled surgery time. Please be ready to share:    Your doctor's clinic name and phone number    Your medical, surgical and anesthesia history    A list of allergies and sensitivities    A list of medicines, including herbal treatments and over-the-counter drugs    Whether the patient has a legal guardian (ask how to send us the papers in advance)  Please tell us if you're pregnant--or if there's any chance you might be pregnant. Some surgeries may injure a fetus (unborn baby), so they require a pregnancy test. Surgeries that are safe for a fetus don't always need a test, and you can choose whether to have one.   If you have a child who's having surgery, please ask for a copy of Preparing for Your Child's Surgery.    Preparing for surgery    Within 10 to 30 days of surgery: Have a pre-op exam (sometimes called an H&P, or History and Physical). This can be done at a clinic or pre-operative center.  ? If you're having a , you may not need this exam. Talk to your care team.    At your pre-op exam, talk to your care team about all medicines you take. If you need to stop any medicines before surgery, ask when to start taking them again.  ? We do this for your safety. Many medicines can make you bleed too much during surgery. Some change how well surgery (anesthesia) drugs work.    Call your insurance company to let them know you're having surgery. (If you don't have insurance, call [phone].)    Call your clinic if there's any change in your health. This includes signs of a cold or flu (sore throat, runny nose, cough, rash, fever). It also includes a scrape or scratch near the surgery site.    If you have questions on the day of surgery, call your hospital or surgery center.  COVID testing  You may need to be tested for COVID-19 before having  surgery. If so, we will give you instructions (or click here).  Eating and drinking guidelines  For your safety: Unless your surgeon tells you otherwise, follow the guidelines below.    Eat and drink as usual until 8 hours before surgery. After that, no food or milk.    Drink clear liquids until 2 hours before surgery. These are liquids you can see through, like water, Gatorade and Propel Water. You may also have black coffee and tea (no cream or milk).    Nothing by mouth within 2 hours of surgery. This includes gum, candy and breath mints.    If you drink alcohol: Stop drinking it the night before surgery.    If your care team tells you to take medicine on the morning of surgery, it's okay to take it with a sip of water.  Preventing infection    Shower or bathe the night before and morning of your surgery. Follow the instructions your clinic gave you. (If no instructions, use regular soap.)    Don't shave or clip hair near your surgery site. We'll remove the hair if needed.    Don't smoke or vape the morning of surgery. You may chew nicotine gum up to 2 hours before surgery. A nicotine patch is okay.  ? Note: Some surgeries require you to completely quit smoking and nicotine. Check with your surgeon.    Your care team will make every effort to keep you safe from infection. We will:  ? Clean our hands often with soap and water (or an alcohol-based hand rub).  ? Clean the skin at your surgery site with a special soap that kills germs.  ? Give you a special gown to keep you warm. (Cold raises the risk of infection.)  ? Wear special hair covers, masks, gowns and gloves during surgery.  ? Give antibiotic medicine, if prescribed. Not all surgeries need antibiotics.  What to bring on the day of surgery    Photo ID and insurance card    Copy of your health care directive, if you have one    Glasses and hearing aides (bring cases)  ? You can't wear contacts during surgery    Inhaler and eye drops, if you use them (tell us  about these when you arrive)    CPAP machine or breathing device, if you use them    A few personal items, if spending the night    If you have . . .  ? A pacemaker, ICD (cardiac defibrillator) or other implant: Bring the ID card.  ? An implanted stimulator: Bring the remote control.  ? A legal guardian: Bring a copy of the certified (court-stamped) guardianship papers.  Please remove any jewelry, including body piercings. Leave jewelry and other valuables at home.  If you're going home the day of surgery    You must have a responsible adult drive you home. They should stay with you overnight as well.    If you don't have someone to stay with you, and you aren't safe to go home alone, we may keep you overnight. Insurance often won't pay for this.  After surgery  If it's hard to control your pain or you need more pain medicine, please call your surgeon's office.  Questions?   If you have any questions for your care team, list them here: _________________________________________________________________________________________________________________________________________________________________________ ____________________________________ ____________________________________ ____________________________________  For informational purposes only. Not to replace the advice of your health care provider. Copyright   2003, 2019 [facility]. All rights reserved. Clinically reviewed by Eliz Rogers MD. [employer] [id number] - REV 07/22.

## 2022-10-04 NOTE — PROGRESS NOTES
[address]  [facility] [address]  Phone: [phone]  Primary Provider: Jeny Correia  Pre-op Performing Provider: CARLINE SANCHEZ      PREOPERATIVE EVALUATION:  Today's date: 10/4/2022    Brittany Corbett is a 57 year old female who presents for a preoperative evaluation.    Surgical Information:  Surgery/Procedure: Cataracts  Surgery Location: [facility]   Surgeon: Dr. Phoenix   Surgery Date: 10/13/22  Time of Surgery: TBD  Where patient plans to recover: At home with family  Fax number for surgical facility: [phone]    Type of Anesthesia Anticipated: Local    Assessment & Plan     The proposed surgical procedure is considered LOW risk.      ICD-10-CM    1. Preop general physical exam  Z01.818    2. Senile cataract of right eye, unspecified age-related cataract type  H25.9    3. Overactive bladder  N32.81        Risks and Recommendations:  The patient has the following additional risks and recommendations for perioperative complications:   - No identified additional risk factors other than previously addressed    Medication Instructions:  Patient is to take all scheduled medications on the day of surgery    RECOMMENDATION:  APPROVAL GIVEN to proceed with proposed procedure, without further diagnostic evaluation.    Review of the result(s) of each unique test - See list      11/4/21 - Glucose, Lipid       Diagnosis or treatment significantly limited by social determinants of health - None     20 minutes spent on the date of the encounter doing chart review, history and exam, documentation and further activities per the note    Signed Electronically by: Carline Sanchez PA-C  Copy of this evaluation report is provided to requesting physician.      Subjective     HPI related to upcoming procedure: e: Patient with cataracts significantly impacting vision, has already had one eye done, now presenting for other eye      Preop Questions 9/27/2022   1. Have you ever had a heart attack or stroke? No   2. Have you ever had surgery on your heart or blood vessels, such as a stent placement, a coronary artery bypass, or surgery on an artery in your head, neck, heart, or legs? No   3. Do you have chest pain with activity? No   4. Do you have a history of  heart failure? No   5. Do you currently have a cold, bronchitis or symptoms of other infection? No   6. Do you have a cough, shortness of breath, or wheezing? No   7. Do you or anyone in your family have previous history of blood clots? No   8. Do you or does anyone in your family have a serious bleeding problem such as prolonged bleeding following surgeries or cuts? No   9. Have you ever had problems with anemia or been told to take iron pills? No   10. Have you had any abnormal blood loss such as black, tarry or bloody stools, or abnormal vaginal bleeding? No   11. Have you ever had a blood transfusion? No   12. Are you willing to have a blood transfusion if it is medically needed before, during, or after your surgery? Yes   13. Have you or any of your relatives ever had problems with anesthesia? No   14. Do you have sleep apnea, excessive snoring or daytime drowsiness? No   15. Do you have any artifical heart valves or other implanted medical devices like a pacemaker, defibrillator, or continuous glucose monitor? No   16. Do you have artificial joints? No   17. Are you allergic to latex? No   18. Is there any chance that you may be pregnant? No       Health Care Directive:  Patient does not have a Health Care Directive or Living Will: Discussed advance care planning with patient; however, patient declined at this time.    Preoperative Review of :   reviewed - no record of controlled substances prescribed.      Status of Chronic Conditions:  See problem list for active medical problems.  Problems all longstanding and stable, except as noted/documented.  See ROS for pertinent  symptoms related to these conditions.      Review of Systems  Constitutional, neuro, ENT, endocrine, pulmonary, cardiac, gastrointestinal, genitourinary, musculoskeletal, integument and psychiatric systems are negative, except as otherwise noted.    Patient Active Problem List    Diagnosis Date Noted     Overactive bladder 08/07/2020     Priority: Medium     Bulging discs 01/26/2015     Priority: Medium     Psoriasis 09/23/2009     Priority: Medium     Other chronic allergic conjunctivitis 04/07/2008     Priority: Medium      Past Medical History:   Diagnosis Date     Psoriasis 09/23/2009     Past Surgical History:   Procedure Laterality Date     BIOPSY  06/01/2014    breast biopsy, right     COLONOSCOPY  07/21/2014    Procedure: COLONOSCOPY;  Surgeon: Duane, William Charles, MD;  Location: [facility] OR     COLONOSCOPY       COSMETIC SURGERY  1988    Birthmark removal - neck     EYE SURGERY  2019    Cataract Left eye     GYN SURGERY  1991, 1993    Laparoscopy - infertility related; D&C     HC TOOTH EXTRACTION W/FORCEP       HEAD & NECK SURGERY       ZZC NONSPECIFIC PROCEDURE      laparoscopy for infertility     Current Outpatient Medications   Medication Sig Dispense Refill     clobetasol (TEMOVATE) 0.05 % external cream Apply topically 2 times daily as needed (rash) 60 g 3     fluocinonide (LIDEX) 0.05 % external solution APPLY TOPICALLY TO THE SCALP ONCE DAILY AS DIRECTED 60 mL 11     fluticasone (FLONASE) 50 MCG/ACT nasal spray Spray 2 sprays into both nostrils daily 1 Package 12     moxifloxacin (VIGAMOX) 0.5 % ophthalmic solution INSTILL ONE DROP INTO SURGICAL EYE FOUR TIMES A DAY FOR 7 DAYS START ONE DAY PRIOR TO SURGERY       prednisoLONE acetate (PRED FORTE) 1 % ophthalmic suspension INSTILL ONE DROP INTO SURGICAL EYE FOUR TIMES A DAY FOR 28 DAYS START ONE DAY PRIOR TO SURGERY       tolterodine (DETROL) 2 MG tablet Take 1 tablet (2 mg) by mouth 2 times daily 180 tablet 3       Allergies   Allergen Reactions     No  "Known Drug Allergies         Social History     Tobacco Use     Smoking status: Never Smoker     Smokeless tobacco: Never Used     Tobacco comment: no smokers in household   Substance Use Topics     Alcohol use: Yes     Comment: 1-2 per week     Family History   Problem Relation Age of Onset     Diabetes Mother         type 2     Cancer Mother         multiple myeloma     Hypertension Mother      Other Cancer Mother         Multiple Myeloma     C.A.D. Father         age 55 for CABG, had second bypass as well     Lipids Father      Depression Father      Cardiovascular Father      Coronary Artery Disease Father      Hyperlipidemia Father      Cancer Paternal Grandmother         cervical     Neurologic Disorder Sister         MS     Breast Cancer Sister         11/2012     Breast Cancer Maternal Aunt      Breast Cancer Other         Cousin     Breast Cancer Cousin      Breast Cancer Sister      History   Drug Use No         Objective     /60 (Cuff Size: Adult Regular)   Pulse 60   Temp (!) 96.3  F (35.7  C) (Temporal)   Resp 18   Ht 1.604 m (5' 3.15\")   Wt 55.6 kg (122 lb 8 oz)   LMP  (LMP Unknown)   SpO2 96%   BMI 21.60 kg/m      Physical Exam    GENERAL APPEARANCE: healthy, alert and no distress     EYES: EOMI, PERRL     RESP: lungs clear to auscultation - no rales, rhonchi or wheezes     CV: regular rates and rhythm, normal S1 S2, no S3 or S4 and no murmur, click or rub     MS: extremities normal- no gross deformities noted, no evidence of inflammation in joints, FROM in all extremities.     SKIN: no suspicious lesions or rashes     PSYCH: mentation appears normal. and affect normal/bright    No results for input(s): HGB, PLT, INR, NA, POTASSIUM, CR, A1C in the last 35706 hours.     Diagnostics:  No labs were ordered during this visit.   No EKG required for low risk surgery (cataract, skin procedure, breast biopsy, etc).    Revised Cardiac Risk Index (RCRI):  The patient has the following serious " cardiovascular risks for perioperative complications:   - No serious cardiac risks = 0 points     RCRI Interpretation: 0 points: Class I (very low risk - 0.4% complication rate)

## 2023-02-18 ENCOUNTER — HEALTH MAINTENANCE LETTER (OUTPATIENT)
Age: 58
End: 2023-02-18

## 2023-03-19 DIAGNOSIS — L40.9 PSORIASIS: ICD-10-CM

## 2023-03-22 RX ORDER — FLUOCINONIDE TOPICAL SOLUTION USP, 0.05% 0.5 MG/ML
SOLUTION TOPICAL
Qty: 60 ML | Refills: 11 | Status: SHIPPED | OUTPATIENT
Start: 2023-03-22 | End: 2024-10-01

## 2023-03-22 NOTE — TELEPHONE ENCOUNTER
"Pending Prescriptions:                       Disp   Refills    fluocinonide (LIDEX) 0.05 % external solut*60 mL  11       Sig: APPLY TO THE SCALP ONCE DAILY        Routing refill request to provider for review/approval because:    Topical Steroids and Nonsteroidals Protocol Failed    Rerun Protocol (3/19/2023 11:43 AM)    High potency steroid not ordered      Patient is age 6 or older      Authorizing prescriber's most recent note related to this medication read.    If refill request is for ophthalmic use, please forward request to provider for approval.    Recent (12 mo) or future (30 days) visit within the authorizing provider's specialty    Patient has had an office visit with the authorizing provider or a provider within the authorizing providers department within the previous 12 mos or has a future within next 30 days. See \"Patient Info\" tab in inbasket, or \"Choose Columns\" in Meds & Orders section of the refill encounter.         Medication is active on med list        "

## 2023-07-16 ASSESSMENT — ENCOUNTER SYMPTOMS
WEAKNESS: 0
NERVOUS/ANXIOUS: 0
NAUSEA: 0
DYSURIA: 0
EYE PAIN: 0
BREAST MASS: 0
FREQUENCY: 0
MYALGIAS: 0
JOINT SWELLING: 0
HEADACHES: 0
COUGH: 0
DIZZINESS: 0
ABDOMINAL PAIN: 0
FEVER: 0
SORE THROAT: 0
HEARTBURN: 0
HEMATOCHEZIA: 0
HEMATURIA: 0
DIARRHEA: 0
CHILLS: 0
CONSTIPATION: 0
ARTHRALGIAS: 0
PALPITATIONS: 0
PARESTHESIAS: 0
SHORTNESS OF BREATH: 0

## 2023-07-18 ENCOUNTER — OFFICE VISIT (OUTPATIENT)
Dept: FAMILY MEDICINE | Facility: OTHER | Age: 58
End: 2023-07-18
Payer: COMMERCIAL

## 2023-07-18 VITALS
DIASTOLIC BLOOD PRESSURE: 68 MMHG | RESPIRATION RATE: 16 BRPM | HEART RATE: 56 BPM | HEIGHT: 64 IN | OXYGEN SATURATION: 98 % | TEMPERATURE: 98.1 F | SYSTOLIC BLOOD PRESSURE: 108 MMHG | WEIGHT: 121 LBS | BODY MASS INDEX: 20.66 KG/M2

## 2023-07-18 DIAGNOSIS — Z00.00 ROUTINE GENERAL MEDICAL EXAMINATION AT A HEALTH CARE FACILITY: Primary | ICD-10-CM

## 2023-07-18 DIAGNOSIS — L40.9 PSORIASIS: ICD-10-CM

## 2023-07-18 DIAGNOSIS — N32.81 OVERACTIVE BLADDER: ICD-10-CM

## 2023-07-18 DIAGNOSIS — Z12.31 ENCOUNTER FOR SCREENING MAMMOGRAM FOR BREAST CANCER: ICD-10-CM

## 2023-07-18 PROCEDURE — 99213 OFFICE O/P EST LOW 20 MIN: CPT | Mod: 25 | Performed by: PHYSICIAN ASSISTANT

## 2023-07-18 PROCEDURE — 99396 PREV VISIT EST AGE 40-64: CPT | Performed by: PHYSICIAN ASSISTANT

## 2023-07-18 RX ORDER — CLOBETASOL PROPIONATE 0.5 MG/G
CREAM TOPICAL 2 TIMES DAILY PRN
Qty: 60 G | Refills: 3 | Status: SHIPPED | OUTPATIENT
Start: 2023-07-18 | End: 2024-08-25

## 2023-07-18 RX ORDER — TOLTERODINE TARTRATE 2 MG/1
2 TABLET, EXTENDED RELEASE ORAL 2 TIMES DAILY
Qty: 180 TABLET | Refills: 3 | Status: SHIPPED | OUTPATIENT
Start: 2023-07-18 | End: 2024-08-25

## 2023-07-18 ASSESSMENT — ENCOUNTER SYMPTOMS
ARTHRALGIAS: 0
DIARRHEA: 0
NAUSEA: 0
ABDOMINAL PAIN: 0
HEARTBURN: 0
NERVOUS/ANXIOUS: 0
DIZZINESS: 0
MYALGIAS: 0
SORE THROAT: 0
BREAST MASS: 0
PARESTHESIAS: 0
SHORTNESS OF BREATH: 0
WEAKNESS: 0
HEMATURIA: 0
PALPITATIONS: 0
EYE PAIN: 0
COUGH: 0
CONSTIPATION: 0
FREQUENCY: 0
HEADACHES: 0
CHILLS: 0
DYSURIA: 0
JOINT SWELLING: 0
FEVER: 0
HEMATOCHEZIA: 0

## 2023-07-18 ASSESSMENT — PAIN SCALES - GENERAL: PAINLEVEL: NO PAIN (0)

## 2023-07-18 NOTE — PROGRESS NOTES
SUBJECTIVE:   CC: Brittany is an 58 year old who presents for preventive health visit.       7/18/2023     1:42 PM   Additional Questions   Roomed by Maryann     Healthy Habits:     Getting at least 3 servings of Calcium per day:  Yes    Bi-annual eye exam:  Yes    Dental care twice a year:  Yes    Sleep apnea or symptoms of sleep apnea:  None    Diet:  Regular (no restrictions)    Frequency of exercise:  4-5 days/week    Duration of exercise:  30-45 minutes    Taking medications regularly:  Yes    Medication side effects:  None    Additional concerns today:  No    Refill creams and Detrol  - Doing well, no side effects       Social History     Tobacco Use     Smoking status: Never     Smokeless tobacco: Never     Tobacco comments:     no smokers in household   Substance Use Topics     Alcohol use: Yes     Comment: 1-2 per week             7/16/2023     9:04 PM   Alcohol Use   Prescreen: >3 drinks/day or >7 drinks/week? No     Reviewed orders with patient.  Reviewed health maintenance and updated orders accordingly - Yes  Lab work is in process  Labs reviewed in EPIC  BP Readings from Last 3 Encounters:   07/18/23 108/68   10/04/22 106/60   07/05/22 93/58    Wt Readings from Last 3 Encounters:   07/18/23 54.9 kg (121 lb)   10/04/22 55.6 kg (122 lb 8 oz)   07/05/22 55.1 kg (121 lb 6.4 oz)                    Breast Cancer Screening:    FHS-7:       8/19/2021     4:03 PM 11/4/2021     9:28 AM 6/28/2022     7:47 AM 9/27/2022     9:05 PM 10/4/2022     3:53 PM 4/15/2023    10:37 AM 7/16/2023     9:05 PM   Breast CA Risk Assessment (FHS-7)   Did any of your first-degree relatives have breast or ovarian cancer? Yes Yes Yes Yes Yes Yes Yes   Did any of your relatives have bilateral breast cancer? No No No No No No No   Did any man in your family have breast cancer? No No No No No No No   Did any woman in your family have breast and ovarian cancer? No Yes Yes Yes No Yes Yes   Did any woman in your family have breast cancer  before age 50 y? Yes Yes Yes Yes Yes Yes Yes   Do you have 2 or more relatives with breast and/or ovarian cancer? No No No No No No No   Do you have 2 or more relatives with breast and/or bowel cancer? No No No No No No No       Mammogram Screening: Recommended mammography every 1-2 years with patient discussion and risk factor consideration  Pertinent mammograms are reviewed under the imaging tab.    History of abnormal Pap smear: NO - age 30-65 PAP every 5 years with negative HPV co-testing recommended      Latest Ref Rng & Units 11/4/2021     9:59 AM 8/9/2016     4:45 PM 8/9/2016    12:00 AM   PAP / HPV   PAP  Negative for Intraepithelial Lesion or Malignancy (NILM)      PAP (Historical)    NIL    HPV 16 DNA Negative Negative  Negative     HPV 18 DNA Negative Negative  Negative     Other HR HPV Negative Negative  Negative       Reviewed and updated as needed this visit by clinical staff   Tobacco  Allergies  Meds  Problems  Med Hx  Surg Hx  Fam Hx          Reviewed and updated as needed this visit by Provider   Tobacco  Allergies  Meds  Problems  Med Hx  Surg Hx  Fam Hx         Past Medical History:   Diagnosis Date     Psoriasis 09/23/2009      Past Surgical History:   Procedure Laterality Date     BIOPSY  06/01/2014    breast biopsy, right     COLONOSCOPY  07/21/2014    Procedure: COLONOSCOPY;  Surgeon: Duane, William Charles, MD;  Location: [facility] OR     COLONOSCOPY       COSMETIC SURGERY  1988    Birthmark removal - neck     EYE SURGERY  2019    Cataract Left eye     GYN SURGERY  1991, 1993    Laparoscopy - infertility related; D&C     HC TOOTH EXTRACTION W/FORCEP       HEAD & NECK SURGERY       ZZC NONSPECIFIC PROCEDURE      laparoscopy for infertility       Review of Systems   Constitutional: Negative for chills and fever.   HENT: Negative for congestion, ear pain, hearing loss and sore throat.    Eyes: Negative for pain and visual disturbance.   Respiratory: Negative for cough and shortness of  "breath.    Cardiovascular: Negative for chest pain, palpitations and peripheral edema.   Gastrointestinal: Negative for abdominal pain, constipation, diarrhea, heartburn, hematochezia and nausea.   Breasts:  Negative for tenderness, breast mass and discharge.   Genitourinary: Negative for dysuria, frequency, genital sores, hematuria, pelvic pain, urgency, vaginal bleeding and vaginal discharge.   Musculoskeletal: Negative for arthralgias, joint swelling and myalgias.   Skin: Negative for rash.   Neurological: Negative for dizziness, weakness, headaches and paresthesias.   Psychiatric/Behavioral: Negative for mood changes. The patient is not nervous/anxious.         OBJECTIVE:   /68   Pulse 56   Temp 98.1  F (36.7  C) (Temporal)   Resp 16   Ht 1.62 m (5' 3.78\")   Wt 54.9 kg (121 lb)   LMP  (LMP Unknown)   SpO2 98%   BMI 20.91 kg/m    Physical Exam  Constitutional:       General: She is not in acute distress.     Appearance: She is well-developed.   HENT:      Right Ear: External ear normal.      Left Ear: External ear normal.      Nose: Nose normal.      Mouth/Throat:      Pharynx: No oropharyngeal exudate.   Eyes:      General:         Right eye: No discharge.         Left eye: No discharge.      Conjunctiva/sclera: Conjunctivae normal.      Pupils: Pupils are equal, round, and reactive to light.   Neck:      Thyroid: No thyromegaly.      Vascular: No JVD.      Trachea: No tracheal deviation.   Cardiovascular:      Rate and Rhythm: Normal rate and regular rhythm.      Heart sounds: Normal heart sounds. No murmur heard.     No friction rub. No gallop.   Pulmonary:      Effort: Pulmonary effort is normal. No respiratory distress.      Breath sounds: Normal breath sounds. No stridor. No wheezing or rales.   Chest:   Breasts:     Breasts are symmetrical.      Right: No inverted nipple, mass, nipple discharge or skin change.      Left: No inverted nipple, mass, nipple discharge or skin change.   Abdominal: "      General: Bowel sounds are normal. There is no distension.      Palpations: Abdomen is soft. There is no mass.      Tenderness: There is no abdominal tenderness. There is no guarding or rebound.      Hernia: No hernia is present.   Musculoskeletal:         General: Normal range of motion.      Cervical back: Normal range of motion and neck supple.   Lymphadenopathy:      Cervical: No cervical adenopathy.   Skin:     General: Skin is warm and dry.   Neurological:      Mental Status: She is alert and oriented to person, place, and time.   Psychiatric:         Behavior: Behavior normal.         Thought Content: Thought content normal.         Judgment: Judgment normal.     Pelvic exam not indicated       Diagnostic Test Results:  Labs reviewed in Epic  none     ASSESSMENT/PLAN:       ICD-10-CM    1. Routine general medical examination at a health care facility  Z00.00       2. Psoriasis  L40.9 clobetasol (TEMOVATE) 0.05 % external cream      3. Overactive bladder  N32.81 tolterodine (DETROL) 2 MG tablet      4. Encounter for screening mammogram for breast cancer  Z12.31 MA Screen Bilateral w/[place]        - Reviewed medication use and side effects, refilled as needed       Patient has been advised of split billing requirements and indicates understanding: Yes      COUNSELING:  Reviewed preventive health counseling, as reflected in patient instructions  Special attention given to:        Regular exercise       Healthy diet/nutrition        She reports that she has never smoked. She has never used smokeless tobacco.    Review of the result(s) of each unique test - See list        11/4/21 - labs, PAP        10/4/22 - Mammogram        7/21/14 - colonoscopy   Diagnosis or treatment significantly limited by social determinants of health - None     20 minutes spent on the date of the encounter doing chart review, history and exam, documentation and further activities as noted above    The patient indicates understanding of  these issues and agrees with the plan.    Camila Sanchez PA-C  [facility]

## 2023-09-05 ENCOUNTER — PATIENT OUTREACH (OUTPATIENT)
Dept: CARE COORDINATION | Facility: CLINIC | Age: 58
End: 2023-09-05
Payer: COMMERCIAL

## 2023-10-03 ENCOUNTER — PATIENT OUTREACH (OUTPATIENT)
Dept: CARE COORDINATION | Facility: CLINIC | Age: 58
End: 2023-10-03
Payer: COMMERCIAL

## 2023-10-12 ENCOUNTER — ANCILLARY PROCEDURE (OUTPATIENT)
Dept: MAMMOGRAPHY | Facility: OTHER | Age: 58
End: 2023-10-12
Attending: PHYSICIAN ASSISTANT
Payer: COMMERCIAL

## 2023-10-12 DIAGNOSIS — Z12.31 ENCOUNTER FOR SCREENING MAMMOGRAM FOR BREAST CANCER: ICD-10-CM

## 2023-10-12 PROCEDURE — 77063 BREAST TOMOSYNTHESIS BI: CPT | Mod: TC | Performed by: RADIOLOGY

## 2023-10-12 PROCEDURE — 77067 SCR MAMMO BI INCL CAD: CPT | Mod: TC | Performed by: RADIOLOGY

## 2023-11-30 ENCOUNTER — VIRTUAL VISIT (OUTPATIENT)
Dept: FAMILY MEDICINE | Facility: OTHER | Age: 58
End: 2023-11-30
Payer: COMMERCIAL

## 2023-11-30 DIAGNOSIS — L40.9 SCALP PSORIASIS: ICD-10-CM

## 2023-11-30 DIAGNOSIS — L65.9 HAIR LOSS: Primary | ICD-10-CM

## 2023-11-30 PROCEDURE — 99214 OFFICE O/P EST MOD 30 MIN: CPT | Mod: VID | Performed by: PHYSICIAN ASSISTANT

## 2023-11-30 NOTE — PATIENT INSTRUCTIONS
- Ferritin, Iron & iron biding capacity, T3 free, T4 free, TSH, T3 total  - Comprehensive metabolic panel   - CBC with platelets and differential   - Anti nuclear antibody   - ESR & CRP (inflammatory markers)   - Thyroid Peroxidase antibody

## 2023-11-30 NOTE — PROGRESS NOTES
Brittany is a 58 year old who is being evaluated via a billable video visit.      How would you like to obtain your AVS? MyChart  If the video visit is dropped, the invitation should be resent by: Text to cell phone: [phone]  Will anyone else be joining your video visit? No    Assessment & Plan     ICD-10-CM    1. Hair loss  L65.9 Adult Dermatology  Referral     Comprehensive metabolic panel (BMP + Alb, Alk Phos, ALT, AST, Total. Bili, TP)     CBC with platelets and differential     Anti Nuclear Nannette IgG by IFA with Reflex     ESR: Erythrocyte sedimentation rate     CRP, inflammation     Thyroid peroxidase antibody     TSH     T4, free     T3, Free     T3, total     Iron and iron binding capacity     Ferritin      2. Scalp psoriasis  L40.9 Adult Dermatology  Referral        - Patient reports loss of her body hair (arms, legs, face) with thinning of hair on her head and eyebrows   - Also creams and solutions no longer work for psoriasis in her scalp   - Recommend dermatology consult for both   - Discussed possible etiology for hair loss including thyroid, anemia, electrolyte imbalance, vitamin deficiency, autoimmune disorder, vs other      Denies other symptoms   - Patient will schedule lab only   - Await results       Review of the result(s) of each unique test - See list          11/4/21 - Glucose, Lipid   Diagnosis or treatment significantly limited by social determinants of health - None     27 minutes spent on the date of the encounter doing chart review, history and exam, documentation and further activities as noted above    The patient indicates understanding of these issues and agrees with the plan.    Follow up: with dermatology and pending labs     Camila Sanchez PA-C  [facility]   Brittany is a 58 year old, presenting for the following health issues:  Hair/Scalp Problem      Hair/Scalp Problem    History of Present Illness        Reason for visit:  Body hair loss  Symptom onset:  More than a month    She eats 2-3 servings of fruits and vegetables daily.She consumes 0 sweetened beverage(s) daily.She exercises with enough effort to increase her heart rate 30 to 60 minutes per day.  She exercises with enough effort to increase her heart rate 5 days per week.   She is taking medications regularly.       Concern - Hair loss   Onset: about 1 year   Description: I've noticed over the past year or so I've lost all the hair on my arms, but didn't think much of it (is it part of aging?). It's progressed to where my facial hair is now gone, eyebrows are thin and my forehead hairline is receeding. Is this just part of aging or is something of concern causing this?   Progression of Symptoms:  constant  Accompanying Signs & Symptoms: Has covid right now but otherwise doing well   Previous history of similar problem: no  Therapies tried and outcome: medication     - Psoriasis on back of head, does seems to be spreading to sides   - Medication just helped with itch not with spreading   - Tried over the counter shampoo and nothing really lasts   - Hairdresser noticed       Review of Systems   Constitutional, HEENT, cardiovascular, pulmonary, gi and gu systems are negative, except as otherwise noted.      Objective       Vitals:  No vitals were obtained today due to virtual visit.    Physical Exam   GENERAL: Healthy, alert and no distress  EYES: Eyes grossly normal to inspection.  No discharge or erythema, or obvious scleral/conjunctival abnormalities.  RESP: No audible wheeze, cough, or visible cyanosis.  No visible retractions or increased work of breathing.    SKIN: Visible skin clear. No significant rash, abnormal pigmentation or lesions.  NEURO: Cranial nerves grossly intact.  Mentation and speech appropriate for age.  PSYCH: Mentation appears normal, affect normal/bright, judgement and insight intact, normal speech and appearance  well-groomed.    Diagnostics: reviewed in Epic         Video-Visit Details    Type of service:  Video Visit   Originating Location (pt. Location): Home  Distant Location (provider location):  Off-site  Platform used for Video Visit: [employer]

## 2023-12-01 ENCOUNTER — LAB (OUTPATIENT)
Dept: LAB | Facility: CLINIC | Age: 58
End: 2023-12-01
Payer: COMMERCIAL

## 2023-12-01 DIAGNOSIS — L65.9 HAIR LOSS: ICD-10-CM

## 2023-12-01 LAB
ALBUMIN SERPL BCG-MCNC: 4.7 G/DL (ref 3.5–5.2)
ALP SERPL-CCNC: 81 U/L (ref 40–150)
ALT SERPL W P-5'-P-CCNC: 17 U/L (ref 0–50)
ANION GAP SERPL CALCULATED.3IONS-SCNC: 11 MMOL/L (ref 7–15)
AST SERPL W P-5'-P-CCNC: 24 U/L (ref 0–45)
BASOPHILS # BLD AUTO: 0 10E3/UL (ref 0–0.2)
BASOPHILS NFR BLD AUTO: 1 %
BILIRUB SERPL-MCNC: 0.4 MG/DL
BUN SERPL-MCNC: 14.1 MG/DL (ref 8–23)
CALCIUM SERPL-MCNC: 9.5 MG/DL (ref 8.8–10.2)
CHLORIDE SERPL-SCNC: 104 MMOL/L (ref 98–107)
CREAT SERPL-MCNC: 0.91 MG/DL (ref 0.51–0.95)
CRP SERPL-MCNC: 3.63 MG/L
DEPRECATED HCO3 PLAS-SCNC: 28 MMOL/L (ref 22–29)
EGFRCR SERPLBLD CKD-EPI 2021: 72 ML/MIN/1.73M2
EOSINOPHIL # BLD AUTO: 0 10E3/UL (ref 0–0.7)
EOSINOPHIL NFR BLD AUTO: 1 %
ERYTHROCYTE [DISTWIDTH] IN BLOOD BY AUTOMATED COUNT: 12.1 % (ref 10–15)
ERYTHROCYTE [SEDIMENTATION RATE] IN BLOOD BY WESTERGREN METHOD: 1 MM/HR (ref 0–30)
FERRITIN SERPL-MCNC: 233 NG/ML (ref 11–328)
GLUCOSE SERPL-MCNC: 95 MG/DL (ref 70–99)
HCT VFR BLD AUTO: 43.4 % (ref 35–47)
HGB BLD-MCNC: 14.4 G/DL (ref 11.7–15.7)
IMM GRANULOCYTES # BLD: 0 10E3/UL
IMM GRANULOCYTES NFR BLD: 0 %
IRON BINDING CAPACITY (ROCHE): 270 UG/DL (ref 240–430)
IRON SATN MFR SERPL: 33 % (ref 15–46)
IRON SERPL-MCNC: 88 UG/DL (ref 37–145)
LYMPHOCYTES # BLD AUTO: 1.1 10E3/UL (ref 0.8–5.3)
LYMPHOCYTES NFR BLD AUTO: 28 %
MCH RBC QN AUTO: 30.4 PG (ref 26.5–33)
MCHC RBC AUTO-ENTMCNC: 33.2 G/DL (ref 31.5–36.5)
MCV RBC AUTO: 92 FL (ref 78–100)
MONOCYTES # BLD AUTO: 0.4 10E3/UL (ref 0–1.3)
MONOCYTES NFR BLD AUTO: 10 %
NEUTROPHILS # BLD AUTO: 2.3 10E3/UL (ref 1.6–8.3)
NEUTROPHILS NFR BLD AUTO: 60 %
NRBC # BLD AUTO: 0 10E3/UL
NRBC BLD AUTO-RTO: 0 /100
PLATELET # BLD AUTO: 223 10E3/UL (ref 150–450)
POTASSIUM SERPL-SCNC: 3.7 MMOL/L (ref 3.4–5.3)
PROT SERPL-MCNC: 7.6 G/DL (ref 6.4–8.3)
RBC # BLD AUTO: 4.73 10E6/UL (ref 3.8–5.2)
SODIUM SERPL-SCNC: 143 MMOL/L (ref 135–145)
T4 FREE SERPL-MCNC: 1.14 NG/DL (ref 0.9–1.7)
TSH SERPL DL<=0.005 MIU/L-ACNC: 1.79 UIU/ML (ref 0.3–4.2)
WBC # BLD AUTO: 3.8 10E3/UL (ref 4–11)

## 2023-12-01 PROCEDURE — 83550 IRON BINDING TEST: CPT

## 2023-12-01 PROCEDURE — 86140 C-REACTIVE PROTEIN: CPT

## 2023-12-01 PROCEDURE — 86376 MICROSOMAL ANTIBODY EACH: CPT

## 2023-12-01 PROCEDURE — 86038 ANTINUCLEAR ANTIBODIES: CPT

## 2023-12-01 PROCEDURE — 85652 RBC SED RATE AUTOMATED: CPT

## 2023-12-01 PROCEDURE — 80053 COMPREHEN METABOLIC PANEL: CPT

## 2023-12-01 PROCEDURE — 82728 ASSAY OF FERRITIN: CPT

## 2023-12-01 PROCEDURE — 83540 ASSAY OF IRON: CPT

## 2023-12-01 PROCEDURE — 84481 FREE ASSAY (FT-3): CPT

## 2023-12-01 PROCEDURE — 84443 ASSAY THYROID STIM HORMONE: CPT

## 2023-12-01 PROCEDURE — 84439 ASSAY OF FREE THYROXINE: CPT

## 2023-12-01 PROCEDURE — 85025 COMPLETE CBC W/AUTO DIFF WBC: CPT

## 2023-12-01 PROCEDURE — 36415 COLL VENOUS BLD VENIPUNCTURE: CPT

## 2023-12-01 PROCEDURE — 84480 ASSAY TRIIODOTHYRONINE (T3): CPT

## 2023-12-02 LAB
T3 SERPL-MCNC: 88 NG/DL (ref 85–202)
T3FREE SERPL-MCNC: 2.5 PG/ML (ref 2–4.4)

## 2023-12-04 ENCOUNTER — MYC MEDICAL ADVICE (OUTPATIENT)
Dept: FAMILY MEDICINE | Facility: OTHER | Age: 58
End: 2023-12-04
Payer: COMMERCIAL

## 2023-12-04 LAB
ANA SER QL IF: NEGATIVE
THYROPEROXIDASE AB SERPL-ACNC: <10 IU/ML

## 2023-12-04 NOTE — RESULT ENCOUNTER NOTE
Margarito Soto    Your results were normal. No etiology for your hair loss found.     The results are attached for your review.       Kevan Sanchez PA-C

## 2023-12-04 NOTE — RESULT ENCOUNTER NOTE
Margarito Soto    Your results were normal so far. Still awaiting a few.     The results are attached for your review.       Kevan Sanchez PA-C

## 2023-12-05 NOTE — TELEPHONE ENCOUNTER
Routing to provider    In response to lab note:   Margarito Soto     Your results were normal. No etiology for your hair loss found.     The results are attached for your review.        Kevan Sanchez PA-C    Review/ advise?    Ryann Katz RN

## 2024-05-16 ENCOUNTER — TRANSFERRED RECORDS (OUTPATIENT)
Dept: HEALTH INFORMATION MANAGEMENT | Facility: CLINIC | Age: 59
End: 2024-05-16
Payer: COMMERCIAL

## 2024-05-22 ENCOUNTER — MYC MEDICAL ADVICE (OUTPATIENT)
Dept: FAMILY MEDICINE | Facility: OTHER | Age: 59
End: 2024-05-22
Payer: COMMERCIAL

## 2024-05-23 NOTE — TELEPHONE ENCOUNTER
Nothing in chart yet. Called [facility] again and they are going to refax to [phone]. Will postpone a few days so we can check again.

## 2024-05-28 NOTE — TELEPHONE ENCOUNTER
Not able to see anything. Just a fax of the report will do.    Kevan Sanchez PA-C  [facility]

## 2024-05-29 NOTE — TELEPHONE ENCOUNTER
Spoke to Faye in medical records at [facility]. Asked for report to be refaxed to upstairs fax. [phone]. Will watch for report.

## 2024-06-06 ENCOUNTER — OFFICE VISIT (OUTPATIENT)
Dept: FAMILY MEDICINE | Facility: OTHER | Age: 59
End: 2024-06-06
Payer: COMMERCIAL

## 2024-06-06 VITALS
DIASTOLIC BLOOD PRESSURE: 68 MMHG | OXYGEN SATURATION: 98 % | HEIGHT: 63 IN | HEART RATE: 64 BPM | SYSTOLIC BLOOD PRESSURE: 112 MMHG | TEMPERATURE: 97.6 F | RESPIRATION RATE: 20 BRPM | WEIGHT: 121 LBS | BODY MASS INDEX: 21.44 KG/M2

## 2024-06-06 DIAGNOSIS — G96.191 PERINEURAL CYST: ICD-10-CM

## 2024-06-06 DIAGNOSIS — M54.50 ACUTE BILATERAL LOW BACK PAIN WITHOUT SCIATICA: Primary | ICD-10-CM

## 2024-06-06 PROCEDURE — 99213 OFFICE O/P EST LOW 20 MIN: CPT | Performed by: PHYSICIAN ASSISTANT

## 2024-06-06 ASSESSMENT — PAIN SCALES - GENERAL: PAINLEVEL: NO PAIN (0)

## 2024-06-06 NOTE — PROGRESS NOTES
Assessment & Plan     ICD-10-CM    1. Acute bilateral low back pain without sciatica  M54.50 Spine  Referral      2. Perineural cyst  G96.191 Spine  Referral        - Patient with history of self limited, mostly musculoskeletal, with 6 months of back pain that is not improving with conservative measures including chiropractic care   - Chiro ordered MRI, while I don't have the images, the report was faxed to me and we reviewed this, MRI shows perineural cyst   - Discussed what this means and recommend referral to spine to discuss options for further treatment since not improving   - Referral placed       Review of the result(s) of each unique test - Report from [facility]ology MRI pelvis dated 5/16/23    Diagnosis or treatment significantly limited by social determinants of health - None     15 minutes spent on the date of the encounter doing chart review, history and exam, documentation and further activities as noted above    The patient indicates understanding of these issues and agrees with the plan.    Follow up: with Spine     Kevan Parr-JOY Sims  [facility] is a 59 year old, presenting for the following health issues:  Musculoskeletal Problem      6/6/2024     7:06 AM   Additional Questions   Roomed by Julee   Accompanied by Self         6/6/2024     7:06 AM   Patient Reported Additional Medications   Patient reports taking the following new medications NA     Musculoskeletal Problem    History of Present Illness       Back Pain:  She presents for follow up of back pain. Patient's back pain is a chronic problem.  Location of back pain:  Right lower back, left lower back, right buttock and left buttock  Description of back pain: burning, dull ache and sharp  Back pain spreads: right buttocks and left buttocks    Since patient first noticed back pain, pain is: always present, but gets better and worse  Does back pain interfere with  "her job:  No       She eats 2-3 servings of fruits and vegetables daily.She consumes 0 sweetened beverage(s) daily.She exercises with enough effort to increase her heart rate 20 to 29 minutes per day.  She exercises with enough effort to increase her heart rate 3 or less days per week.   She is taking medications regularly.     - Hurts with things that should help like stretching, exercising, foam rolling     More active she is the more it hurts     Does yoga every day but parts of it hurt now     Can't massage it   - Feels different then previous, feels deep inside     - 10 years ago went to Chiro, so this time went back and it is not getting better   - Chiro order MRI   - No sciatic pain   - Normal bowel and bladder function   - Both sides but more left             Review of Systems  Constitutional, neuro, ENT, endocrine, pulmonary, cardiac, gastrointestinal, genitourinary, musculoskeletal, integument and psychiatric systems are negative, except as otherwise noted.      Objective    /68   Pulse 64   Temp 97.6  F (36.4  C) (Temporal)   Resp 20   Ht 1.6 m (5' 2.99\")   Wt 54.9 kg (121 lb)   LMP  (LMP Unknown)   SpO2 98%   BMI 21.44 kg/m    Body mass index is 21.44 kg/m .  Physical Exam   GENERAL APPEARANCE: healthy, alert and no distress  EYES: Eyes grossly normal to inspection, PERRLA, conjunctivae and sclerae without injection or discharge, EOM intact   MS: No musculoskeletal defects are noted and gait is age appropriate without ataxia   SKIN: No suspicious lesions or rashes, hydration status appears adeuqate with normal skin turgor   BACK: Deferred   PSYCH: Alert and oriented x3; speech- coherent , normal rate and volume; able to articulate logical thoughts, able to abstract reason, no tangential thoughts, no hallucinations or delusions, mentation appears normal, Mood is euthymic. Affect is appropriate for this mood state and bright. Thought content is free of suicidal ideation, hallucinations, and " delusions. Dress is adequate and upkept. Eye contact is good during conversation.           Diagnostics: abstracted MRI         Signed Electronically by: Camila Sanchez PA-C

## 2024-06-11 NOTE — PROGRESS NOTES
"    SUBJECTIVE:  HPI:  Brittany Corbett  Is a 59 year old female who presents for new patient evaluation of nonradiating low back pain and perineural cyst upon referral from MONIK Sanchez, whose 6/6/2024 office note records:  \" Hurts with things that should help like stretching, exercising, foam rolling     More active she is the more it hurts     Does yoga every day but parts of it hurt now     Can't massage it   - Feels different then previous, feels deep inside      - 10 years ago went to Chiro, so this time went back and it is not getting better   - Chiro order MRI   - No sciatic pain   - Normal bowel and bladder function   - Both sides but more left    Patient with history of self limited, mostly musculoskeletal, with 6 months of back pain that is not improving with conservative measures including chiropractic care   - Chiro ordered MRI, while I don't have the images, the report was faxed to me and we reviewed this, MRI shows perineural cyst   - Discussed what this means and recommend referral to spine to discuss options for further treatment since not improving\"      Brittany confirms the above history.  She had an MRI 10 years ago.  She recovered with a combination of physical therapy, home exercise and chiropractic care.  6 months ago the current episode began without an inciting event and exercises that are designed to strengthen her back such as bird dogs and MDT seem to make it worse.  Interestingly, while not changing any of her approach, recently the pain started to improve.  There is no leg pain,  no numbness tingling or weakness, no bowel or bladder dysfunction or saddle anesthesia.  At 1 time she was having some left-sided hemipelvic pain but that is pretty much gone.  Has been doing a lot of foam rolling and stretching and yoga.    SYMPTOMS WORSENED WITH sitting on a soft surface such as a couch, long walks, weight lifting, exercise, back strengthening exercises.    SYMPTOMS IMPROVED " WITH inactivity, sitting on a more solid surface her chair, yoga, stretching    Pain score and diagram reviewed.  See questionnaire.      ROS: .  Otherwise negative for bowel/bladder dysfunction, balance changes, headache, leg pain/numbness/weakness, fevers, chills, night sweats, unexplained weight loss;  otherwise unremarkable.   See the patient's intake questionnaire from today for details.        MEDICATIONS:  Reviewed.    ALLERGIES:  Reviewed.     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  She and her spouse have 2 children and no grandchildren.  Sports hobbies and activities, biking, yoga, stretching, distance training, country skiing, hiking, snowshoeing, outdoor activities      OBJECTIVE:    IMAGING: Images and report reviewed     MRI OF THE PELVIS, WITHOUT CONTRAST ([facility]) 5/16/2024    COMPARISONS: None available.    Osseous structures of the bony pelvis are intact and unremarkable.    The sacroiliac joints appear unremarkable.    The symphysis pubis appears unremarkable.    No evidence of myotendinous injury.    No convincing pelvic mass identified as visualized, however evaluation is limited.    Small sacral perineural cyst formation is noted as seen on coronal series 7 image 7-11 and axial series 11 images 11-15.    _________________________________________________________    IMPRESSION:  1. No evidence of pelvic bone marrow edema, fracture or myotendinous injury.  2. Unremarkable appearance of the sacroiliac joints.  3. Small sacral perineural cyst formation.  (OM-I compared these images to the 1/22/2015 images from [place], not available to the radiologist from [facility], and these perineural cysts are unchanged and were present previously.)      EXAMINATION:    --CONSTITUTIONAL:   No acute distress.  The patient is well nourished and  well groomed.  BMI normal.  Transitions and moves fluidly.  --SKIN:  Skin over the face, bilateral lower extremities, and posterior torso is clean, dry, intact without rashes.    --GAIT:  is non-antalgic. Flat foot, heel and toe walking:  normal   .  Squat and rise   normal    .  --STANDING EXAMINATION:    Symmetry of spine/pelvis   unremarkable   .      Range of motion full fluid and painless.   Standing flexion      positive left.    Stevan's sign      positive left.     Stork test     positive left.   --NEUROLOGICAL:    SENSATION to light touch is intact in bilateral thighs, lower legs and feet.   REFLEXES:  patellar 2+, and achilles 1+.  Babinski is negative. No clonus.  MANUAL MOTOR TESTING:  L1- S1 Myotomes, Femoral, Obturator, Peroneal and Tibial nerves 5/5   DURAL STRETCH TESTS:  SLR negative.  Femoral Stretch Test not done.   --PELVIC/HIP JOINTS:                Long Sitting     left long to even.    Spring testing negative but decreased left SI compliance.      PELVIC ALIGNMENT left anterior innominate rotation.   --LUMBAR/GLUTEAL MUSCLES: Mild left gluteus medius tenderness otherwise negative.    --VASCULAR: Femoral pulses 2+. Lower extremity capillary refill, temperature and color normal.       Procedure note-OMT:  Medicine restore normal pelvic alignment.  Long sitting test became negative with equal leg lengths.  Spring testing of the SI joints showed restoration of normal left SI compliance.  Left gluteal palpation was nontender.  Prone press up felt better.  Lumbar range of motion became hyper normal in extension and felt much better in all ranges.      ASSESSMENT: Brittany Corbett is a 59 year old female who presents  today for new patient evaluation of:    Pelvic Joint Dysfunction manifesting as a left anterior innominate rotation  Incidental asymptomatic and pre-existing perineural cysts.      PLAN:  She is an outstanding physical condition and that is a great prognostic indicator.  Pelvic  stabilization PT.  Self-correction each morning.  Return to clinic if needed.    Advised patient to call or return early if symptoms worsen, or having problems controlling bladder and bowel function or worsening leg weakness.     Please note: Voice recognition software was used in this dictation.  It may therefore contain typographical errors.    Jan Rowe MD

## 2024-06-12 ENCOUNTER — OFFICE VISIT (OUTPATIENT)
Dept: NEUROSURGERY | Facility: CLINIC | Age: 59
End: 2024-06-12
Attending: PHYSICIAN ASSISTANT
Payer: COMMERCIAL

## 2024-06-12 VITALS
SYSTOLIC BLOOD PRESSURE: 117 MMHG | WEIGHT: 121 LBS | DIASTOLIC BLOOD PRESSURE: 69 MMHG | HEIGHT: 63 IN | HEART RATE: 65 BPM | BODY MASS INDEX: 21.44 KG/M2

## 2024-06-12 DIAGNOSIS — G96.191 PERINEURAL CYST: ICD-10-CM

## 2024-06-12 DIAGNOSIS — M54.50 ACUTE BILATERAL LOW BACK PAIN WITHOUT SCIATICA: ICD-10-CM

## 2024-06-12 DIAGNOSIS — M99.05 SOMATIC DYSFUNCTION OF PELVIS REGION: Primary | ICD-10-CM

## 2024-06-12 PROCEDURE — 98925 OSTEOPATH MANJ 1-2 REGIONS: CPT | Performed by: PREVENTIVE MEDICINE

## 2024-06-12 PROCEDURE — 99203 OFFICE O/P NEW LOW 30 MIN: CPT | Mod: 25 | Performed by: PREVENTIVE MEDICINE

## 2024-06-12 ASSESSMENT — PAIN SCALES - GENERAL: PAINLEVEL: NO PAIN (1)

## 2024-06-12 NOTE — LETTER
"6/12/2024      Brittany Corbett  [address] [facility] [id number]      Dear Colleague,    Thank you for referring your patient, Brittany Corbett, to the [facility] [place]. Please see a copy of my visit note below.        SUBJECTIVE:  HPI:  Brittany Corbett  Is a 59 year old female who presents for new patient evaluation of nonradiating low back pain and perineural cyst upon referral from MONIK Sanchez, whose 6/6/2024 office note records:  \" Hurts with things that should help like stretching, exercising, foam rolling     More active she is the more it hurts     Does yoga every day but parts of it hurt now     Can't massage it   - Feels different then previous, feels deep inside      - 10 years ago went to Chiro, so this time went back and it is not getting better   - Chiro order MRI   - No sciatic pain   - Normal bowel and bladder function   - Both sides but more left    Patient with history of self limited, mostly musculoskeletal, with 6 months of back pain that is not improving with conservative measures including chiropractic care   - Chiro ordered MRI, while I don't have the images, the report was faxed to me and we reviewed this, MRI shows perineural cyst   - Discussed what this means and recommend referral to spine to discuss options for further treatment since not improving\"      Brittany confirms the above history.  She had an MRI 10 years ago.  She recovered with a combination of physical therapy, home exercise and chiropractic care.  6 months ago the current episode began without an inciting event and exercises that are designed to strengthen her back such as bird dogs and MDT seem to make it worse.  Interestingly, while not changing any of her approach, recently the pain started to improve.  There is no leg pain,  no numbness tingling or weakness, no bowel or bladder dysfunction or saddle anesthesia.  At 1 time she was having some left-sided " hemipelvic pain but that is pretty much gone.  Has been doing a lot of foam rolling and stretching and yoga.    SYMPTOMS WORSENED WITH sitting on a soft surface such as a couch, long walks, weight lifting, exercise, back strengthening exercises.    SYMPTOMS IMPROVED WITH inactivity, sitting on a more solid surface her chair, yoga, stretching    Pain score and diagram reviewed.  See questionnaire.      ROS: .  Otherwise negative for bowel/bladder dysfunction, balance changes, headache, leg pain/numbness/weakness, fevers, chills, night sweats, unexplained weight loss;  otherwise unremarkable.   See the patient's intake questionnaire from today for details.        MEDICATIONS:  Reviewed.    ALLERGIES:  Reviewed.     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  She and her spouse have 2 children and no grandchildren.  Sports hobbies and activities, biking, yoga, stretching, distance training, country skiing, hiking, snowshoeing, outdoor activities      OBJECTIVE:    IMAGING: Images and report reviewed     MRI OF THE PELVIS, WITHOUT CONTRAST ([facility]) 5/16/2024    COMPARISONS: None available.    Osseous structures of the bony pelvis are intact and unremarkable.    The sacroiliac joints appear unremarkable.    The symphysis pubis appears unremarkable.    No evidence of myotendinous injury.    No convincing pelvic mass identified as visualized, however evaluation is limited.    Small sacral perineural cyst formation is noted as seen on coronal series 7 image 7-11 and axial series 11 images 11-15.    _________________________________________________________    IMPRESSION:  1. No evidence of pelvic bone marrow edema, fracture or myotendinous injury.  2. Unremarkable appearance of the sacroiliac joints.  3. Small sacral perineural cyst formation.  (OM-I  compared these images to the 1/22/2015 images from [place], not available to the radiologist from [facility], and these perineural cysts are unchanged and were present previously.)      EXAMINATION:    --CONSTITUTIONAL:   No acute distress.  The patient is well nourished and well groomed.  BMI normal.  Transitions and moves fluidly.  --SKIN:  Skin over the face, bilateral lower extremities, and posterior torso is clean, dry, intact without rashes.    --GAIT:  is non-antalgic. Flat foot, heel and toe walking:  normal   .  Squat and rise   normal    .  --STANDING EXAMINATION:    Symmetry of spine/pelvis   unremarkable   .      Range of motion full fluid and painless.   Standing flexion      positive left.    Stevan's sign      positive left.     Stork test     positive left.   --NEUROLOGICAL:    SENSATION to light touch is intact in bilateral thighs, lower legs and feet.   REFLEXES:  patellar 2+, and achilles 1+.  Babinski is negative. No clonus.  MANUAL MOTOR TESTING:  L1- S1 Myotomes, Femoral, Obturator, Peroneal and Tibial nerves 5/5   DURAL STRETCH TESTS:  SLR negative.  Femoral Stretch Test not done.   --PELVIC/HIP JOINTS:                Long Sitting     left long to even.    Spring testing negative but decreased left SI compliance.      PELVIC ALIGNMENT left anterior innominate rotation.   --LUMBAR/GLUTEAL MUSCLES: Mild left gluteus medius tenderness otherwise negative.    --VASCULAR: Femoral pulses 2+. Lower extremity capillary refill, temperature and color normal.       Procedure note-OMT:  Medicine restore normal pelvic alignment.  Long sitting test became negative with equal leg lengths.  Spring testing of the SI joints showed restoration of normal left SI compliance.  Left gluteal palpation was nontender.  Prone press up felt better.  Lumbar range of motion became hyper normal in extension and felt much better in all ranges.      ASSESSMENT: Brittany Corbett is a 59 year old female who presents  today for new  patient evaluation of:    Pelvic Joint Dysfunction manifesting as a left anterior innominate rotation  Incidental asymptomatic and pre-existing perineural cysts.      PLAN:  She is an outstanding physical condition and that is a great prognostic indicator.  Pelvic stabilization PT.  Self-correction each morning.  Return to clinic if needed.    Advised patient to call or return early if symptoms worsen, or having problems controlling bladder and bowel function or worsening leg weakness.     Please note: Voice recognition software was used in this dictation.  It may therefore contain typographical errors.    Jan Rowe MD             Again, thank you for allowing me to participate in the care of your patient.        Sincerely,        Jan Rowe MD

## 2024-06-12 NOTE — NURSING NOTE
"Reason For Visit:   Chief Complaint   Patient presents with    Consult     Low back pain         Occupation: Admin assitant  Currently working? Yes.  Work status?  Full time.    Sports: n  Activities: walking,yoga             /69   Pulse 65   Ht 1.6 m (5' 3\")   Wt 54.9 kg (121 lb)   LMP  (LMP Unknown)   BMI 21.43 kg/m        Allergies   Allergen Reactions    No Known Drug Allergy        Current Outpatient Medications   Medication Sig Dispense Refill    clobetasol (TEMOVATE) 0.05 % external cream Apply topically 2 times daily as needed (rash) 60 g 3    fluocinonide (LIDEX) 0.05 % external solution APPLY TO THE SCALP ONCE DAILY 60 mL 11    fluticasone (FLONASE) 50 MCG/ACT nasal spray Spray 2 sprays into both nostrils daily 1 Package 12    tolterodine (DETROL) 2 MG tablet Take 1 tablet (2 mg) by mouth 2 times daily 180 tablet 3     No current facility-administered medications for this visit.         Darla Severin-Brown, LPN    "

## 2024-06-12 NOTE — PATIENT INSTRUCTIONS
"Brittany I am glad that you came in so we can identify and treat your Pelvic Joint Dysfunction and I am very encouraged by your response.  I will have you work with our therapist to teach you some other exercises and I do not think you will need to come back and see me but I am happy to see you if things are not getting better.  See the assessment and plan below for further details of our discussion today.  Do the self-correction each morning as described below.    ASSESSMENT: Brittany Corbett is a 59 year old female who presents  today for new patient evaluation of:    Pelvic Joint Dysfunction manifesting as a left anterior innominate rotation  Incidental asymptomatic and pre-existing perineural cysts.      PLAN:  She is an outstanding physical condition and that is a great prognostic indicator.  Pelvic stabilization PT.  Self-correction each morning.  Return to clinic if needed.        PELVIC JOINT SELF CORRECTION EXERCISES      It is best to do this first thing in the morning, and can be repeated once or twice during the day.    \"SHOTGUN\" TECHNIQUE:  This loosens up the front and back of the pelvis.  Do this before the Broomstick exercise.  Use on object such as a rectangular laundry basket.  Lie on your back with your knees bent, feet together and flat on the floor.    Spread your knees approximately 12-24 inches around the outside of an upright laundry basket.  Squeeze your knees together, breathing as you do this.    Concentrate on keeping your buttocks relaxed and on the ground.    A brief discomfort in the front of the pelvis and even a popping sound is normal.  Hold the squeeze for 3-5 seconds.    Relax for 3-5 seconds.    Repeat 2 more times.    Now reverse your knee position to the inside of the upside down laundry basket while still lying with your knees bent up, feet on the ground.  Pull your knees apart, breathing easy as you do this.  Hold the squeeze for 3-5 seconds.    Relax for 3-5 seconds.    Repeat " 2 more times.    BROOMSTICK EXERCISE:  Lie on your back with your knees bent.  Slide a broomstick or similar object above one knee, and below the opposite knee.  Firmly hold the stick with your hands will bringing your knees closed to your belly, preferably high enough that your back can rest flat on the ground.  Still holding the stick, scissors your legs against the stick, breathing as you do this.    (Push down to your foot with leg on top of the broomstick, and lift up to your chin with the leg below the broomstick).  Hold the squeeze for 3-5 seconds.    Relax for 3-5 seconds.    Repeat 2 more times.  Switch the position of the broomstick above the other knee, and below the first knee.  Repeat the exercise as above in this new position.

## 2024-06-15 ENCOUNTER — THERAPY VISIT (OUTPATIENT)
Dept: PHYSICAL THERAPY | Facility: CLINIC | Age: 59
End: 2024-06-15
Attending: PREVENTIVE MEDICINE
Payer: COMMERCIAL

## 2024-06-15 DIAGNOSIS — M54.50 ACUTE BILATERAL LOW BACK PAIN WITHOUT SCIATICA: ICD-10-CM

## 2024-06-15 DIAGNOSIS — M99.05 SOMATIC DYSFUNCTION OF PELVIS REGION: ICD-10-CM

## 2024-06-15 PROCEDURE — 97161 PT EVAL LOW COMPLEX 20 MIN: CPT | Mod: GP

## 2024-06-15 PROCEDURE — 97110 THERAPEUTIC EXERCISES: CPT | Mod: GP

## 2024-06-15 NOTE — PROGRESS NOTES
PHYSICAL THERAPY EVALUATION  Type of Visit: Evaluation       Fall Risk Screen:  Fall screen completed by: PT  Have you fallen 2 or more times in the past year?: No  Have you fallen and had an injury in the past year?: No  Is patient a fall risk?: No    Subjective       Presenting condition or subjective complaint: Feels like my spine is arthritic/stiff; feels like my spine or hip/pelvis is stuck.  I have pain that worsens the more active I am, and it doesn't feel like a muscular issue.  It's bothersome at night.  Date of onset: 01/01/24    Relevant medical history:     Dates & types of surgery:      Prior diagnostic imaging/testing results: MRI     Prior therapy history for the same diagnosis, illness or injury: No        Living Environment  Social support: With a significant other or spouse   Type of home: House   Stairs to enter the home: Yes 2 Is there a railing: No     Ramp: No   Stairs inside the home: Yes 12 Is there a railing: Yes     Help at home: Other  Equipment owned:       Employment: Yes  at a school  Hobbies/Interests: Hiking/walking, biking, gardening, reading, painting, cross country skiing, snowshoeing    Patient goals for therapy: Be more active - lift weights,  do fitness classes, yoga, walk without pain or stiffness.      SUBJECTIVE SUMMARY:  Brittany is a 59 year old female who reports that her pain has come on gradually.  It feels primarily like left glute muscle pains and she did a lot of hip flexor / glute stretching.  She found that doing a lot of the beachbody programs were aggravating her left glute muscles.  She has been going to a chiropractor for many years and reports she has difficulty doing supermans and left hip marching / core exercises.  She reports overall her symptoms have improved a lot, but they aren't consistent and not feeling better.  Dr. Rowe gave her isometrics exercises for the pelvis and it results in an ache in the center of her low back. Walking used to be  "\"not be fun\".  Now it hurts when she starts and improves by the end of her walk. She reports feeling better after visiting Dr. Rowe.     Patient reports symptoms of:  Pain, Stiffness / Tightness, and Weakness    Patient report of Pain:  Pain Rating Now: 0/10  Pain Rating at Best: 0/10  Pain Rating at Worst: 4/10  Pain Location: Central Low Back  Pain Quality/Description: Ache and Burning  Pain Better with: Rest  Pain Worse with: Activity and lifting  Progression of Symptoms: Improving    Patient reports Red Flags symptoms of:  None    OBJECTIVE:  Seated Lower Extremity Manual Muscle Test / Myotome Assessment:  Hip Flexion (L2): Right Leg 5/5, Left Leg 5/5  Knee Extension (L3): Right Leg 5/5, Left Leg 5/5  Ankle Dorsiflexion (L4): Right Leg 5/5, Left Leg 5/5  Great Toe (L5): Right Leg 5/5, Left Leg 5/5  Ankle Plantarflexion (S1): Right Leg 5/5, Left Leg 5/5  Knee Flexion (S2): Right Leg 5/5, Left Leg 5/5    Seated Neural Tension Assessment:   Slump Test: Right Leg: Negative Slump Test, Left Leg: Negative Slump Test    Standing Lumbar Active ROM:  Standing Lumbar Flexion (Hands slide down thighs): Knuckles to Ground  Standing Lumbar Extension: Mild Restriction of 25% of ROM    MDT Directional Preference Assessment:  Repeated Standing Lumbar Flexion  No effect    Prone Lying:  No effect    Prone on Elbows Lying:  No effect    Prone Press Ups:   No effect      PJD ASSESSMENT  STANDING ASSESSMENT    -Stevan's Sign: negative and points to lower lumbar    -Standing Iliac Crest Level: negative    -Standing Stork (Gillet's) Test: positive for pain but increased    -Standing Flexion Test: negative      SUPINE ASSESSMENT:  -Supine ASIS Level: positive for slight left more palpable than right    -Supine Long Sitting Test: positive for slight left neutral to short      PRONE ASSESSMENT:  -Prone PSIS Level: negative    -Prone Ishcial Tuberosity Level: negative    -Prone Sacrum Level: negative    -Prone Sphynx Test: " negative    -Prone Sacral Spring Test: negative      SACROILIAC PROVOCATION TESTS:  Thigh Thrust: negative  Sacral Thrust: negative  Distraction: negative  Compression: negative  Gaenslens: not done      LUMBAR / GLUTEAL MUSCLES:  Pain with palpation of the lower lumbar spine      PELVIC ALIGNMENT:  Left Anterior Innominate       ASSESSMENT:  Brittany is a 59 year old female referred to Physical Therapy for Acute Bilateral Low Back Pain without Sciatica, Somatic Dysfunction of the Pelvis   from [facility].  Brittany demonstrates findings of Pain that justify a need for formal Physical Therapy. These impairments interfere with their ability to perform self care tasks, work tasks, recreational activities, household chores, driving , household mobility, and community mobility as compared to their previous level of function.    Medical Diagnosis: Acute Bilateral Low Back Pain without Sciatica, Somatic Dysfunction of the Pelvis    Treatment Diagnosis: Chronic Bilateral Low Back Pain without sciatica     Clinical Decision Making (Complexity):  Clinical Presentation: Stable/Uncomplicated  Clinical Presentation Rationale: based on medical and personal factors listed in PT evaluation  Clinical Decision Making (Complexity): Low complexity      PHYSICAL THERAPY PLAN OF CARE:  Treatment Interventions:  Modalities: Cryotherapy, Hot Pack, Mechanical Traction, Ultrasound  Interventions: Manual Therapy, Neuromuscular Re-education, Therapeutic Activity, Therapeutic Exercise    Long Term Goals     PT Goal 1  Goal Identifier: Lifting  Goal Description: Patient will be able to lift 20 lbs off the ground and carry it 100 feet without low back pain  Rationale: to maximize safety and independence within the community;to maximize safety and independence with performance of ADLs and functional tasks;to maximize safety and independence within the home;to maximize safety and independence with transportation;to maximize safety and independence with  self cares  Goal Progress: Pain initially with walking and pain with lifting  Target Date: 09/07/24    Frequency of Treatment: 1x a week  Duration of Treatment: 12 weeks         Risks and benefits of evaluation/treatment have been explained.   Patient/Family/caregiver agrees with Plan of Care.      Evaluation Time:     PT Eval, Low Complexity Minutes (96448): 25         Signing Clinician: Wilfred Post, PT        SUMMARY OF PLAN OF CARE:  Patient arrives to physical therapy for orders for pelvic stabilization.  She exercises often and focuses on glute exercises and core exercises, but still has pain with squatting, lifting or positions in yoga such as chair pose.  She shoes no directional preference other than some stiffness at end range, she does show some hypermobility.  We will initiate therapy with Pelvic Stabilization as this helped her in her visit with Dr. Rowe.  In the future we can implement modified core strengthening (She often has pain with planks and birddogs, so modification or core exercises could be of benefit), reassessment of directional preference given her lumbar extension lack of ROM, general stabilization and strengthening given her hypermobility in her spine and lastly a review of her lifting mechanics using a functional movement screen or squat assessment.

## 2024-06-18 ENCOUNTER — PATIENT OUTREACH (OUTPATIENT)
Dept: CARE COORDINATION | Facility: CLINIC | Age: 59
End: 2024-06-18
Payer: COMMERCIAL

## 2024-06-20 ENCOUNTER — THERAPY VISIT (OUTPATIENT)
Dept: PHYSICAL THERAPY | Facility: CLINIC | Age: 59
End: 2024-06-20
Attending: PREVENTIVE MEDICINE
Payer: COMMERCIAL

## 2024-06-20 DIAGNOSIS — M54.50 CHRONIC LEFT-SIDED LOW BACK PAIN WITHOUT SCIATICA: Primary | ICD-10-CM

## 2024-06-20 DIAGNOSIS — G89.29 CHRONIC LEFT-SIDED LOW BACK PAIN WITHOUT SCIATICA: Primary | ICD-10-CM

## 2024-06-20 PROCEDURE — 97110 THERAPEUTIC EXERCISES: CPT | Mod: GP

## 2024-06-27 ENCOUNTER — THERAPY VISIT (OUTPATIENT)
Dept: PHYSICAL THERAPY | Facility: CLINIC | Age: 59
End: 2024-06-27
Attending: PREVENTIVE MEDICINE
Payer: COMMERCIAL

## 2024-06-27 DIAGNOSIS — M54.50 CHRONIC LEFT-SIDED LOW BACK PAIN WITHOUT SCIATICA: Primary | ICD-10-CM

## 2024-06-27 DIAGNOSIS — G89.29 CHRONIC LEFT-SIDED LOW BACK PAIN WITHOUT SCIATICA: Primary | ICD-10-CM

## 2024-06-27 PROCEDURE — 97110 THERAPEUTIC EXERCISES: CPT | Mod: GP

## 2024-07-15 ENCOUNTER — MYC MEDICAL ADVICE (OUTPATIENT)
Dept: FAMILY MEDICINE | Facility: OTHER | Age: 59
End: 2024-07-15
Payer: COMMERCIAL

## 2024-07-16 ENCOUNTER — TELEPHONE (OUTPATIENT)
Dept: NEUROSURGERY | Facility: CLINIC | Age: 59
End: 2024-07-16
Payer: COMMERCIAL

## 2024-07-16 NOTE — TELEPHONE ENCOUNTER
"RN returned call to patient to further discuss her concerns. She explained that on 7/14 she started experiencing a tingling sensation down her right leg (she's had this issue before). However she ended up going to the ED later that day after she developed new symptoms to which she described as \"unbearable pain and numbness\" of her anterior pubic bone and through her right hip. She was seen at [facility] and was told that she was having a severe muscle spasm. Per the ED visit note, the MD states that her clinical history is most consistent with musculoskeletal pain, and she was prescribed Robaxin and a Medrol Dosepak. No imaging was completed. She denied any trauma, injury our over-activity that could have led to her symptoms. She has been working with a physical therapist, her latest session was at the end of last month and she's scheduled for her next session later this week.    Today the pain and tingling of her right leg has improved, but the numbness and pain of her groin and into her right hip continues. She denied any changes to bowel or bladder. She denied any sudden weakness of her lower extremities. She has full sensation of her buttock, and reports that she can still feel when she wipes after using the bathroom. RN advised that if she were too develop any of the symptoms above or lose sensation when she wipes, she should return to the ED for imaging.     Patient has been alternating between heat/ice and tylenol/ibuprofen. She thinks the robaxin has been helpful and she feels best when laying down. She is on day 2 of her Medrol Dosepak. She was hoping to be able to get in sooner with Dr. Rowe, but unfortunately he has no availability before her scheduled appointment on 7/30. Patient has been added to the provider's wait list. She is wondering if there's anything else Dr. Rowe would advise for pain control in the meantime. She mentioned that she has a chiropractor appointment in about an hour. Will route " encounter to Dr. Rowe to advise and review.      MARTHA PleitezN RN Care Coordinator  Neurology/Neurosurgery/PM&R/Pain Management

## 2024-07-16 NOTE — TELEPHONE ENCOUNTER
Patient should return to neurosurgery (Dr. Rowe)    Kevan Sanchez PA-C  [facility]

## 2024-07-16 NOTE — TELEPHONE ENCOUNTER
Other: Requesting c/b- had a set back over the weekend would like a c/b to discuss until her appt on 07/30     Could we send this information to you in [employer] or would you prefer to receive a phone call?:   Patient would prefer a phone call   Okay to leave a detailed message?: No at Cell number on file:    Telephone Information:   Mobile [phone]

## 2024-07-17 NOTE — TELEPHONE ENCOUNTER
RN spoke with patient and relayed the following message from Dr. Rowe:    I agree with the advice documented.  She has pelvic stabilization PT scheduled, so if her Pelvic Joint Dysfunction has reoccurred, they can address that in PT.  I am happy to see her sooner if we can get an opening.  Continue with the self pelvic correction I taught her.      Patient reports slow improvement with her pain and a little less numbness of her groin/right-hip today. She found the chiropractor to be helpful yesterday and she plans on doing some muscle work with PT at her appointment tomorrow. Assured patient that she is still on the wait list in case anything were to open up sooner with Dr. Rowe. She will keep us updated if her pain/numbness gets worse.    MARTHA PleitezN RN Care Coordinator  Neurology/Neurosurgery/PM&R/Pain Management

## 2024-07-18 ENCOUNTER — THERAPY VISIT (OUTPATIENT)
Dept: PHYSICAL THERAPY | Facility: CLINIC | Age: 59
End: 2024-07-18
Payer: COMMERCIAL

## 2024-07-18 DIAGNOSIS — G89.29 CHRONIC LEFT-SIDED LOW BACK PAIN WITHOUT SCIATICA: Primary | ICD-10-CM

## 2024-07-18 DIAGNOSIS — M54.50 CHRONIC LEFT-SIDED LOW BACK PAIN WITHOUT SCIATICA: Primary | ICD-10-CM

## 2024-07-18 PROCEDURE — 97110 THERAPEUTIC EXERCISES: CPT | Mod: GP

## 2024-07-18 PROCEDURE — 97164 PT RE-EVAL EST PLAN CARE: CPT | Mod: GP

## 2024-07-18 NOTE — PROGRESS NOTES
"  Subjective:    Brittany Corbett is a 59 year old female who presents today for follow-up regarding   Pelvic Joint Dysfunction manifesting as a left anterior innominate rotation  Incidental asymptomatic and pre-existing perineural cysts.  Self-correction, PT, or return to clinic if needed.    PRIOR HISTORY from 6/12/2024:  She was seen for evaluation of nonradiating low back pain and perineural cyst upon referral from MONIK Sanchez, whose 6/6/2024 office note records:  \" Hurts with things that should help like stretching, exercising, foam rolling     More active she is the more it hurts     Does yoga every day but parts of it hurt now     Can't massage it   - Feels different then previous, feels deep inside      - 10 years ago went to Chiro, so this time went back and it is not getting better   - Chiro order MRI   - No sciatic pain   - Normal bowel and bladder function   - Both sides but more left    Patient with history of self limited, mostly musculoskeletal, with 6 months of back pain that is not improving with conservative measures including chiropractic care   - Chiro ordered MRI, while I don't have the images, the report was faxed to me and we reviewed this, MRI shows perineural cyst   - Discussed what this means and recommend referral to spine to discuss options for further treatment since not improving\"        Brittany confirms the above history.  She had an MRI 10 years ago.  She recovered with a combination of physical therapy, home exercise and chiropractic care.  6 months ago the current episode began without an inciting event and exercises that are designed to strengthen her back such as bird dogs and MDT seem to make it worse.  Interestingly, while not changing any of her approach, recently the pain started to improve.  There is no leg pain,  no numbness tingling or weakness, no bowel or bladder dysfunction or saddle anesthesia.  At 1 time she was having some left-sided hemipelvic pain " but that is pretty much gone.  Has been doing a lot of foam rolling and stretching and yoga.    INTERIM HISTORY:    Brittany has a different set of symptoms.  Her pelvis is basically gotten better.  2 weeks ago she was bending over to clean her car and then took a long car ride home and she was so sore that on 7/14/2024 she went to the emergency department where they reported 2 days of pain.  She was noticing the pain higher up in her back and radiating down her right anterior thigh and she developed a sensory deficit in roughly an L1-L2 distribution but not into the genital area.  No weakness bowel or bladder dysfunction.  She required some morphine, IV prednisone, Toradol and methocarbamol in the emergency department and they discharged her with methocarbamol and a Medrol Dosepak.  A couple days later she went to see a chiropractor and subsequently a massage and they noticed tightness in her right paralumbar right glut and right IT band and hip flexors.  She saw a different chiropractor yesterday who started working more on her pelvis.  That seems to be helping.  She saw Wilfred in PT and has been doing her home self-correction and exercises every day.  Accompanying all of this 2 weeks worth of symptoms as she has noticed a slight bulge that is painless in her right suprapubic area and she feels like she has to hold it when she coughs.  She has an appointment to see her family doctor about that.  Her chiropractor also said something about a ligament injury in her inguinal area and talked about PRP.  We discussed that.    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  She and her spouse have 2 children and no grandchildren.  Sports hobbies and activities, biking, yoga, stretching, distance training, country skiing, hiking, snowshoeing, outdoor  activities    Objective:    IMAGING:   Images and report reviewed      MRI OF THE PELVIS, WITHOUT CONTRAST ([facility]) 5/16/2024     COMPARISONS: None available.     Osseous structures of the bony pelvis are intact and unremarkable.     The sacroiliac joints appear unremarkable.     The symphysis pubis appears unremarkable.     No evidence of myotendinous injury.     No convincing pelvic mass identified as visualized, however evaluation is limited.     Small sacral perineural cyst formation is noted as seen on coronal series 7 image 7-11 and axial series 11 images 11-15.     _________________________________________________________     IMPRESSION:  1. No evidence of pelvic bone marrow edema, fracture or myotendinous injury.  2. Unremarkable appearance of the sacroiliac joints.  3. Small sacral perineural cyst formation.  (OM-I compared these images to the 1/22/2015 images from [facility], not available to the radiologist from [facility], and these perineural cysts are unchanged and were present previously.)    EXAMINATION:    CONSTITUTIONAL:  Vital signs as above.  No acute distress.  The patient is well nourished and well groomed.  She does not appear to be in tremendous pain and she transitions well and moves fluidly.  PSYCHIATRIC:  The patient is awake, alert, oriented to person, place and time.  The patient is answering questions appropriately with clear speech.  Normal affect.  Able to follow commands  MUSCULOSKELETAL:  Gait is non-antalgic.  The patient is able to heel and toe walk without any difficulty.   Squat and rise normal.   .  Back ROM: Full and painless in flexion.  Limited and painful in extension.  Pelvis appears level standing and left shoulder is slightly lower.  Stork test negative.  Stevan sign equivocal right.  Standing flexion test positive left.     NEUROLOGICAL:    Motor:  L1-S1 myotomes 5/5     Sensation to light touch is diminished in the right L1 distribution proximal anterior thigh towards the  "pubis, but otherwise intact in the bilateral lower extremities.    SLR negative.  Femoral stretch test negative  Pelvis: She has a long sitting test going along to short on the left.  She has a left anterior innominate rotation and a left posterior sacral torsion.    Painless reducible small ventral hernia right paracentral just above the conjoined abdominal tendon    Procedure note-OMT:  Manual medicine restore normal pelvic alignment and she had better and less painful lumbar extension.  She did not have any change in the sensory deficit of the right proximal anterior thigh    Assessment     Brittany Corbett  is a 59 year old y.o. female who presents today for follow-up regarding   Pelvic Joint Dysfunction manifesting as a recurrent left anterior innominate rotation, plus a left posterior sacral torsion-encouraging response to OMT which is something that her chiropractor has not yet done.  Sensory deficit sit in a right L1 distribution, and I am hoping that this is a \"Jenga\" syndrome which will resolve after the pelvis has been straightened out  Incidental painless right lower abdominal ventral hernia  Incidental asymptomatic and pre-existing perineural cysts.      Plan:  Continue doing her home exercises.  Continue with her chiropractor and inform him what I did.  Recheck in 6 weeks and if she still has a sensory deficit in about 4  Weeks send me a [employer] message so I can order an MRI before her follow-up visit.  Continue all her home exercises in the meantime.  She will see her family doctor regarding the ventral hernia.     Advised patient to call or return early if symptoms worsen, or having problems controlling bladder and bowel function or worsening leg weakness.     Please note: Voice recognition software was used in this dictation.  It may therefore contain typographical errors.  Jan Rowe MD  "

## 2024-07-18 NOTE — PROGRESS NOTES
07/18/24 0500   Appointment Info   Signing clinician's name / credentials Wilfred Post, PT, DPT, CSCS, CLT   Total/Authorized Visits E&T   Visits Used 4   Medical Diagnosis Acute Bilateral Low Back Pain without Sciatica, Somatic Dysfunction of the Pelvis   PT Tx Diagnosis Chronic Bilateral Low Back Pain without sciatica   Progress Note/Certification   Onset of illness/injury or Date of Surgery 01/01/24   Therapy Frequency 1x a week   Predicted Duration 12 weeks   Progress Note Due Date 09/07/24   Progress Note Completed Date 06/15/24   PT Goal 1   Goal Identifier Lifting   Goal Description Patient will be able to lift 20 lbs off the ground and carry it 100 feet without low back pain   Rationale to maximize safety and independence within the community;to maximize safety and independence with performance of ADLs and functional tasks;to maximize safety and independence within the home;to maximize safety and independence with transportation;to maximize safety and independence with self cares   Goal Progress Pain initially with walking and pain with lifting   Target Date 09/07/24   Subjective Report   Subjective Report She reports she was doing fine with all of the exercises, including the press up exercises. However, about a week ago, she reports she cleaned her  car.  In this sustained bent forward position, she felt some tight extreme spasms in her low back that would create tingling down her right leg.  She tried foam rolling, walking and resting while stopping all of the exercises. This ende dup getting worse, tighter and developed radicular numbness with numbness going to her anterior hip on the right (not the initial left).  When she had this 10 years ago, she had an MRI which showed bulging discs.  She did a lot of chiropractic and physical therapy, which helped quite a bit. This pain feels different than the initial pain she returned to Dr. Rowe.  She just took prednisone and a muscle relaxor prior to  coming in.  She had a lot back ache with right anterior radicular pain hip pain.  Currently the pain level number is about 3/10   Objective Measures   Objective Measures Objective Measure 1;Objective Measure 2;Objective Measure 3;Objective Measure 4;Objective Measure 5;Objective Measure 6   Objective Measure 1   Objective Measure Pelvic Joint Dysfunction Assessment   Details Standing Pelvis Assessment: Iliac Crest Height (Neutral), Standing Lumbar Flexion Test: Increased Palpation of the Right compared to Left, Gillets March Test: Increased Palpation on the Right PSIS,  Supine to Sit Leg Length Test: Right Leg Neutral to Long, Supine ASIS Level: Negligable, Prone PSIS level: Negligable, Prone Sacral Thrust: Negative, Prone Lumbar PA pressure: Positive and reproduced low back pain as well as right glute radicular pain. Stevan's Sign: Negative and pain is not in region of the right PSIS,  Tenderness with palpation of lumbar spine, bilateral glute medius and right piriformis.   Objective Measure 2   Objective Measure Slump Test   Details Negative BIlaterally   Objective Measure 3   Objective Measure Manual Muscle Testing   Details All muscles 5/5 in lower extremities bilaterally, however muscle tightness/soreness with right hip flexor and right quad testing   Objective Measure 4   Objective Measure Directional Preference   Details No directional preference with no tolerance for prone lying or prone on elbows as well as no relief or change with repeated lumbar flexion   Objective Measure 5   Objective Measure Lumbar ROM   Details Flexion: WNL but lumbar extension is limited to prone on elbows due to pain   Treatment Interventions (PT)   Interventions Therapeutic Procedure/Exercise   Therapeutic Procedure/Exercise   Therapeutic Procedures: strength, endurance, ROM, flexibility minutes (51136) 8   Therapeutic Procedures Ther Proc 2;Ther Proc 3   Ther Proc 1 GM Isometrics   Ther Proc 1 - Details 3x5 for 5 second holds with  right hip flexion, abductions and adductions (told to NOT do left hip flexion)   Ther Proc 2 GM MET for right posterior innominate rotation   Ther Proc 2 - Details 3x5 for 5 second holds   Eval/Assessments   Assessments PT Re-Eval   PT Eval, Re-eval Minutes (72137) 30   Plan   Home program See PTRx   Updates to plan of care Continue per POC   Plan for next session Has had severe flare up with right sided symptoms.  Hasn't had good or significant change with GM protocol, but has no tolerance and no evidence to support proceeding with MDT based interventions at this time.  Pain follows a radicular pattern, but doesn't tolerate extension at the moment so instead we'll utilize just [employer] for pelvic stabilization for a right posterior innominate rotation and will adapt from there   Total Session Time   Timed Code Treatment Minutes 8   Total Treatment Time (sum of timed and untimed services) 38           PLAN  A Re-Evaluation was performed today.  Last week Brittany developed a severe flare up after cleaning her husbands car which required medical attention from urgent care.  She has ceased the exercise program we've provided to her and her symptoms have been subsiding with rest, muscle relaxers and steroid medication.      Our reevaluation reassessed her pelvis which found the same landmarks and same findings as her other assessments performed by myself and by Dr. Rowe.  However instead of treating it like a left anterior innominate rotation, we've decided to treat it like a right posterior innominate rotation.      Our reevaluation also reassessed her lumbar spine.  She has a negative slump test bilaterally (this was previously positive), has no MDT directional preference, no myotomal strength loss, range of motion loss into extension and pain that does follow dermatomal patterns.     At this time, we've elected to simplfy her home exercise program and resort just to the pelvic stabilization isometrics  as she slowly recovers from her recent flare up.  Based on her response to isometrics, we will implement more or altered interventions in the future. She is scheduled to follow up with her referring provider Dr. Rowe in two weeks.    Beginning/End Dates of Progress Note Reporting Period:  06/15/24 to 07/18/2024    Referring Provider:  Jan Rowe MD

## 2024-07-25 ENCOUNTER — THERAPY VISIT (OUTPATIENT)
Dept: PHYSICAL THERAPY | Facility: CLINIC | Age: 59
End: 2024-07-25
Payer: COMMERCIAL

## 2024-07-25 DIAGNOSIS — M54.50 CHRONIC LEFT-SIDED LOW BACK PAIN WITHOUT SCIATICA: Primary | ICD-10-CM

## 2024-07-25 DIAGNOSIS — G89.29 CHRONIC LEFT-SIDED LOW BACK PAIN WITHOUT SCIATICA: Primary | ICD-10-CM

## 2024-07-25 PROCEDURE — 97110 THERAPEUTIC EXERCISES: CPT | Mod: GP

## 2024-07-30 ENCOUNTER — OFFICE VISIT (OUTPATIENT)
Dept: NEUROSURGERY | Facility: CLINIC | Age: 59
End: 2024-07-30
Payer: COMMERCIAL

## 2024-07-30 VITALS
DIASTOLIC BLOOD PRESSURE: 70 MMHG | HEART RATE: 69 BPM | HEIGHT: 63 IN | WEIGHT: 120 LBS | BODY MASS INDEX: 21.26 KG/M2 | SYSTOLIC BLOOD PRESSURE: 109 MMHG

## 2024-07-30 DIAGNOSIS — M54.16 LUMBAR RADICULOPATHY: ICD-10-CM

## 2024-07-30 DIAGNOSIS — M99.05 SOMATIC DYSFUNCTION OF PELVIS REGION: ICD-10-CM

## 2024-07-30 DIAGNOSIS — G96.191 PERINEURAL CYST: Primary | ICD-10-CM

## 2024-07-30 PROCEDURE — 99213 OFFICE O/P EST LOW 20 MIN: CPT | Mod: 25 | Performed by: PREVENTIVE MEDICINE

## 2024-07-30 PROCEDURE — 98925 OSTEOPATH MANJ 1-2 REGIONS: CPT | Performed by: PREVENTIVE MEDICINE

## 2024-07-30 ASSESSMENT — PAIN SCALES - GENERAL: PAINLEVEL: MILD PAIN (2)

## 2024-07-30 NOTE — PATIENT INSTRUCTIONS
"Brittany I think this will get better.  The ventral hernia I think is probably present and if you want it to improve we will probably need surgery but I will let your family doctor and your general surgeon advise you on that.  See the assessment and plan below for further details of our conversation today and I am looking forward to seeing you back here in 6 weeks, hopefully feeling back to normal again.    Assessment     Brittany Corbett  is a 59 year old y.o. female who presents today for follow-up regarding   Pelvic Joint Dysfunction manifesting as a recurrent left anterior innominate rotation, plus a left posterior sacral torsion-encouraging response to OMT which is something that her chiropractor has not yet done.  Sensory deficit sit in a right L1 distribution, and I am hoping that this is a \"Jenga\" syndrome which will resolve after the pelvis has been straightened out  Incidental painless right lower abdominal ventral hernia  Incidental asymptomatic and pre-existing perineural cysts.      Plan:  Continue doing her home exercises.  Continue with her chiropractor and inform him what I did.  Recheck in 6 weeks and if she still has a sensory deficit in about 4  Weeks send me a [employer] message so I can order an MRI before her follow-up visit.  Continue all her home exercises in the meantime.  She will see her family doctor regarding the ventral hernia.   "

## 2024-07-30 NOTE — NURSING NOTE
"Reason For Visit:   Chief Complaint   Patient presents with    RECHECK     Low back pain       Occupation: Admin assitant  Currently working? Yes.  Work status?  Full time.     Sports: n  Activities: walking,yoga           /70   Pulse 69   Ht 1.6 m (5' 3\")   Wt 54.4 kg (120 lb)   LMP  (LMP Unknown)   BMI 21.26 kg/m        Allergies   Allergen Reactions    No Known Drug Allergy        Current Outpatient Medications   Medication Sig Dispense Refill    clobetasol (TEMOVATE) 0.05 % external cream Apply topically 2 times daily as needed (rash) 60 g 3    fluocinonide (LIDEX) 0.05 % external solution APPLY TO THE SCALP ONCE DAILY 60 mL 11    fluticasone (FLONASE) 50 MCG/ACT nasal spray Spray 2 sprays into both nostrils daily 1 Package 12    tolterodine (DETROL) 2 MG tablet Take 1 tablet (2 mg) by mouth 2 times daily 180 tablet 3     No current facility-administered medications for this visit.         Darla Severin-Brown, LPN   "

## 2024-07-30 NOTE — LETTER
"7/30/2024      Brittany Corbett  [address] [facility] [address]      Dear Colleague,    Thank you for referring your patient, Brittany Corbett, to the [facility] [place]. Please see a copy of my visit note below.      Subjective:    Brittany Corbett is a 59 year old female who presents today for follow-up regarding   Pelvic Joint Dysfunction manifesting as a left anterior innominate rotation  Incidental asymptomatic and pre-existing perineural cysts.  Self-correction, PT, or return to clinic if needed.    PRIOR HISTORY from 6/12/2024:  She was seen for evaluation of nonradiating low back pain and perineural cyst upon referral from MONIK Sanchez, whose 6/6/2024 office note records:  \" Hurts with things that should help like stretching, exercising, foam rolling     More active she is the more it hurts     Does yoga every day but parts of it hurt now     Can't massage it   - Feels different then previous, feels deep inside      - 10 years ago went to Chiro, so this time went back and it is not getting better   - Chiro order MRI   - No sciatic pain   - Normal bowel and bladder function   - Both sides but more left    Patient with history of self limited, mostly musculoskeletal, with 6 months of back pain that is not improving with conservative measures including chiropractic care   - Chiro ordered MRI, while I don't have the images, the report was faxed to me and we reviewed this, MRI shows perineural cyst   - Discussed what this means and recommend referral to spine to discuss options for further treatment since not improving\"        Brittany confirms the above history.  She had an MRI 10 years ago.  She recovered with a combination of physical therapy, home exercise and chiropractic care.  6 months ago the current episode began without an inciting event and exercises that are designed to strengthen her back such as bird dogs and MDT seem to make it worse.  " Interestingly, while not changing any of her approach, recently the pain started to improve.  There is no leg pain,  no numbness tingling or weakness, no bowel or bladder dysfunction or saddle anesthesia.  At 1 time she was having some left-sided hemipelvic pain but that is pretty much gone.  Has been doing a lot of foam rolling and stretching and yoga.    INTERIM HISTORY:    Brittany has a different set of symptoms.  Her pelvis is basically gotten better.  2 weeks ago she was bending over to clean her car and then took a long car ride home and she was so sore that on 7/14/2024 she went to the emergency department where they reported 2 days of pain.  She was noticing the pain higher up in her back and radiating down her right anterior thigh and she developed a sensory deficit in roughly an L1-L2 distribution but not into the genital area.  No weakness bowel or bladder dysfunction.  She required some morphine, IV prednisone, Toradol and methocarbamol in the emergency department and they discharged her with methocarbamol and a Medrol Dosepak.  A couple days later she went to see a chiropractor and subsequently a massage and they noticed tightness in her right paralumbar right glut and right IT band and hip flexors.  She saw a different chiropractor yesterday who started working more on her pelvis.  That seems to be helping.  She saw Wilfred in PT and has been doing her home self-correction and exercises every day.  Accompanying all of this 2 weeks worth of symptoms as she has noticed a slight bulge that is painless in her right suprapubic area and she feels like she has to hold it when she coughs.  She has an appointment to see her family doctor about that.  Her chiropractor also said something about a ligament injury in her inguinal area and talked about PRP.  We discussed that.    PAST MEDICAL/SURGICAL HISTORY:   Pertinent for psoriasis, overactive bladder, 1991, 1993 laparoscopy for infertility     SOCIAL HX: She is an   at a primary school and she lives in [place].  She and her spouse have 2 children and no grandchildren.  Sports hobbies and activities, biking, yoga, stretching, distance training, country skiing, hiking, snowshoeing, outdoor activities    Objective:    IMAGING:   Images and report reviewed      MRI OF THE PELVIS, WITHOUT CONTRAST ([facility]) 5/16/2024     COMPARISONS: None available.     Osseous structures of the bony pelvis are intact and unremarkable.     The sacroiliac joints appear unremarkable.     The symphysis pubis appears unremarkable.     No evidence of myotendinous injury.     No convincing pelvic mass identified as visualized, however evaluation is limited.     Small sacral perineural cyst formation is noted as seen on coronal series 7 image 7-11 and axial series 11 images 11-15.     _________________________________________________________     IMPRESSION:  1. No evidence of pelvic bone marrow edema, fracture or myotendinous injury.  2. Unremarkable appearance of the sacroiliac joints.  3. Small sacral perineural cyst formation.  (OM-I compared these images to the 1/22/2015 images from [place], not available to the radiologist from [facility], and these perineural cysts are unchanged and were present previously.)    EXAMINATION:    CONSTITUTIONAL:  Vital signs as above.  No acute distress.  The patient is well nourished and well groomed.  She does not appear to be in tremendous pain and she transitions well and moves fluidly.  PSYCHIATRIC:  The patient is awake, alert, oriented to person, place and time.  The patient is answering questions appropriately with clear speech.  Normal affect.  Able to follow commands  MUSCULOSKELETAL:  Gait is non-antalgic.  The patient is able to heel and toe walk without any difficulty.   Squat and rise normal.   .  Back ROM: Full and painless in flexion.  Limited and painful in extension.  Pelvis appears level standing and left shoulder is slightly  "lower.  Stork test negative.  Stevan sign equivocal right.  Standing flexion test positive left.     NEUROLOGICAL:    Motor:  L1-S1 myotomes 5/5     Sensation to light touch is diminished in the right L1 distribution proximal anterior thigh towards the pubis, but otherwise intact in the bilateral lower extremities.    SLR negative.  Femoral stretch test negative  Pelvis: She has a long sitting test going along to short on the left.  She has a left anterior innominate rotation and a left posterior sacral torsion.    Painless reducible small ventral hernia right paracentral just above the conjoined abdominal tendon    Procedure note-OMT:  Manual medicine restore normal pelvic alignment and she had better and less painful lumbar extension.  She did not have any change in the sensory deficit of the right proximal anterior thigh    Assessment     Brittany Corbett  is a 59 year old y.o. female who presents today for follow-up regarding   Pelvic Joint Dysfunction manifesting as a recurrent left anterior innominate rotation, plus a left posterior sacral torsion-encouraging response to OMT which is something that her chiropractor has not yet done.  Sensory deficit sit in a right L1 distribution, and I am hoping that this is a \"Jenga\" syndrome which will resolve after the pelvis has been straightened out  Incidental painless right lower abdominal ventral hernia  Incidental asymptomatic and pre-existing perineural cysts.      Plan:  Continue doing her home exercises.  Continue with her chiropractor and inform him what I did.  Recheck in 6 weeks and if she still has a sensory deficit in about 4  Weeks send me a [employer] message so I can order an MRI before her follow-up visit.  Continue all her home exercises in the meantime.  She will see her family doctor regarding the ventral hernia.     Advised patient to call or return early if symptoms worsen, or having problems controlling bladder and bowel function or worsening leg " weakness.     Please note: Voice recognition software was used in this dictation.  It may therefore contain typographical errors.  Jan Rowe MD      Again, thank you for allowing me to participate in the care of your patient.        Sincerely,        Jan Rowe MD

## 2024-08-01 ENCOUNTER — THERAPY VISIT (OUTPATIENT)
Dept: PHYSICAL THERAPY | Facility: CLINIC | Age: 59
End: 2024-08-01
Payer: COMMERCIAL

## 2024-08-01 DIAGNOSIS — G89.29 CHRONIC LEFT-SIDED LOW BACK PAIN WITHOUT SCIATICA: Primary | ICD-10-CM

## 2024-08-01 DIAGNOSIS — M54.50 CHRONIC LEFT-SIDED LOW BACK PAIN WITHOUT SCIATICA: Primary | ICD-10-CM

## 2024-08-01 PROCEDURE — 97110 THERAPEUTIC EXERCISES: CPT | Mod: GP

## 2024-08-01 ASSESSMENT — PAIN SCALES - PAIN ENJOYMENT GENERAL ACTIVITY SCALE (PEG)
AVG_PAIN_PASTWEEK: 2
INTERFERED_GENERAL_ACTIVITY: 0
INTERFERED_ENJOYMENT_LIFE: 1
INTERFERED_GENERAL_ACTIVITY: 0 - DOES NOT INTERFERE
AVG_PAIN_PASTWEEK: 2
PEG_TOTALSCORE: 1
PEG_TOTALSCORE: 1
INTERFERED_ENJOYMENT_LIFE: 1

## 2024-08-01 ASSESSMENT — PATIENT HEALTH QUESTIONNAIRE - PHQ9
SUM OF ALL RESPONSES TO PHQ QUESTIONS 1-9: 0
SUM OF ALL RESPONSES TO PHQ QUESTIONS 1-9: 0

## 2024-08-01 ASSESSMENT — ANXIETY QUESTIONNAIRES
3. WORRYING TOO MUCH ABOUT DIFFERENT THINGS: NOT AT ALL
4. TROUBLE RELAXING: NOT AT ALL
GAD7 TOTAL SCORE: 0
7. FEELING AFRAID AS IF SOMETHING AWFUL MIGHT HAPPEN: NOT AT ALL
6. BECOMING EASILY ANNOYED OR IRRITABLE: NOT AT ALL
GAD7 TOTAL SCORE: 0
5. BEING SO RESTLESS THAT IT IS HARD TO SIT STILL: NOT AT ALL
GAD7 TOTAL SCORE: 0
7. FEELING AFRAID AS IF SOMETHING AWFUL MIGHT HAPPEN: NOT AT ALL
1. FEELING NERVOUS, ANXIOUS, OR ON EDGE: NOT AT ALL
2. NOT BEING ABLE TO STOP OR CONTROL WORRYING: NOT AT ALL

## 2024-08-06 ENCOUNTER — OFFICE VISIT (OUTPATIENT)
Dept: FAMILY MEDICINE | Facility: OTHER | Age: 59
End: 2024-08-06
Payer: COMMERCIAL

## 2024-08-06 VITALS
WEIGHT: 117.5 LBS | HEART RATE: 69 BPM | RESPIRATION RATE: 16 BRPM | DIASTOLIC BLOOD PRESSURE: 72 MMHG | TEMPERATURE: 97.2 F | OXYGEN SATURATION: 99 % | SYSTOLIC BLOOD PRESSURE: 120 MMHG | BODY MASS INDEX: 20.81 KG/M2

## 2024-08-06 DIAGNOSIS — K43.9 VENTRAL HERNIA WITHOUT OBSTRUCTION OR GANGRENE: Primary | ICD-10-CM

## 2024-08-06 DIAGNOSIS — M54.50 CHRONIC LEFT-SIDED LOW BACK PAIN WITHOUT SCIATICA: ICD-10-CM

## 2024-08-06 DIAGNOSIS — G89.29 CHRONIC LEFT-SIDED LOW BACK PAIN WITHOUT SCIATICA: ICD-10-CM

## 2024-08-06 PROCEDURE — 99214 OFFICE O/P EST MOD 30 MIN: CPT | Performed by: PHYSICIAN ASSISTANT

## 2024-08-06 ASSESSMENT — PAIN SCALES - GENERAL: PAINLEVEL: NO PAIN (0)

## 2024-08-06 NOTE — PROGRESS NOTES
Assessment & Plan     ICD-10-CM    1. Ventral hernia without obstruction or gangrene  K43.9 Adult Gen Surg  Referral      2. Chronic left-sided low back pain without sciatica  M54.50     G89.29           MED REC REQUIRED{  Post Medication Reconciliation Status:  Discharge medications reconciled, continue medications without change      Hernia   - Agree with neurosurgeon, does seem like a hernia   - Discussed etiology and how to monitor, discussed warning signs of hernia   - Gave hand out on hernias   - Recommend general surgery consult     2. Back pain   - Reviewed neurosurgery note   - Recommend patient reach out so neurosurgery can order MRI since still having sensory deficit         Review of the result(s) of each unique test - Outside MRI 5/16/24    Diagnosis or treatment significantly limited by social determinants of health - None     40 minutes spent on the date of the encounter doing chart review, history and exam, documentation and further activities as noted above    The patient indicates understanding of these issues and agrees with the plan.    Follow up: with specialists     Kevan Parr-JOY Sims  [facility] [place]           Pj Soto is a 59 year old, presenting for the following health issues:  Hospital F/U    HPI     ED/UC Followup:    Facility:  [facility]   Date of visit: 7/14/24  Reason for visit: back spasm   Current Status: noticing a bulge in her LRQ       - Met with neurosurgery   - Right side   - Told if still numb to get more imaging   - Chiro and stretching      Slowly back is getting better   - No medication     Done with muscle relaxer   - Chiro did MRI in May         Review of Systems  Constitutional, neuro, ENT, endocrine, pulmonary, cardiac, gastrointestinal, genitourinary, musculoskeletal, integument and psychiatric systems are negative, except as otherwise noted.      Objective    /72   Pulse 69   Temp 97.2  F (36.2  C)  (Temporal)   Resp 16   Wt 53.3 kg (117 lb 8 oz)   LMP  (LMP Unknown)   SpO2 99%   BMI 20.81 kg/m    Body mass index is 20.81 kg/m .  Physical Exam   GENERAL APPEARANCE: healthy, alert and no distress  EYES: Eyes grossly normal to inspection, PERRLA, conjunctivae and sclerae without injection or discharge, EOM intact   ABD: Normal bowel sounds, non-tender, no masses or heptosplenomegaly,  painless reducible small ventral hernia right paracentral just above the conjoined abdominal tendon   MS: No musculoskeletal defects are noted and gait is age appropriate without ataxia   SKIN: No suspicious lesions or rashes, hydration status appears adeuqate with normal skin turgor   PSYCH: Alert and oriented x3; speech- coherent , normal rate and volume; able to articulate logical thoughts, able to abstract reason, no tangential thoughts, no hallucinations or delusions, mentation appears normal, Mood is euthymic. Affect is appropriate for this mood state and bright. Thought content is free of suicidal ideation, hallucinations, and delusions. Dress is adequate and upkept. Eye contact is good during conversation.       Diagnostics: none         Signed Electronically by: Camila Sanchez PA-C

## 2024-08-15 ENCOUNTER — THERAPY VISIT (OUTPATIENT)
Dept: PHYSICAL THERAPY | Facility: CLINIC | Age: 59
End: 2024-08-15
Payer: COMMERCIAL

## 2024-08-15 DIAGNOSIS — G89.29 CHRONIC LEFT-SIDED LOW BACK PAIN WITHOUT SCIATICA: Primary | ICD-10-CM

## 2024-08-15 DIAGNOSIS — M54.50 CHRONIC LEFT-SIDED LOW BACK PAIN WITHOUT SCIATICA: Primary | ICD-10-CM

## 2024-08-15 PROCEDURE — 97110 THERAPEUTIC EXERCISES: CPT | Mod: GP

## 2024-08-15 NOTE — PROGRESS NOTES
08/15/24 0500   Appointment Info   Signing clinician's name / credentials Wilfred Post, PT, DPT, CSCS, CLT   Total/Authorized Visits E&T   Visits Used 7   Medical Diagnosis Acute Bilateral Low Back Pain without Sciatica, Somatic Dysfunction of the Pelvis   PT Tx Diagnosis Chronic Bilateral Low Back Pain without sciatica   Progress Note/Certification   Onset of illness/injury or Date of Surgery 01/01/24   Therapy Frequency 1x a week   Predicted Duration 12 weeks   Progress Note Due Date 09/07/24   Progress Note Completed Date 06/15/24   PT Goal 1   Goal Identifier Lifting   Goal Description Patient will be able to lift 20 lbs off the ground and carry it 100 feet without low back pain   Rationale to maximize safety and independence within the community;to maximize safety and independence with performance of ADLs and functional tasks;to maximize safety and independence within the home;to maximize safety and independence with transportation;to maximize safety and independence with self cares   Goal Progress She reports she hasn't been lifting a whole lot and she hasn't done a work out with lifting.  However, when she lifts cautiously she gets no increase in pain.   Target Date 09/07/24   Subjective Report   Subjective Report She reports that the chiropractor visits are going well and it's a new chiropractor who adjusts the pelvis a lot more.  She was recommended orthotics which is giving her different aches but that's normal.  Everything is better overall, but things are not 100% still.  She does have a hernia and she is still numb in her anterior right thigh.  She can walk her typical distances with less pain, she can partcipate in yoga.   Objective Measures   Objective Measures Objective Measure 1;Objective Measure 2;Objective Measure 3;Objective Measure 4;Objective Measure 5;Objective Measure 6   Objective Measure 1   Objective Measure Pain on Arrival   Details 0/10   Objective Measure 2   Objective Measure Slump  Test   Details Slight Positive on the Right Side   Objective Measure 3   Objective Measure Manual Muscle Testing   Details All 5/5 bilaterally   Objective Measure 4   Objective Measure CONNIE / Raquel   Details CONNIE: 4 (8.89%) / Raquel: 0 (Low)   Objective Measure 5   Objective Measure Directional Preference   Details No Directional Preference / No Response to MDT   Objective Measure 6   Objective Measure Pelvic Alignment   Details Neutral   Treatment Interventions (PT)   Interventions Therapeutic Procedure/Exercise   Therapeutic Procedure/Exercise   Therapeutic Procedures: strength, endurance, ROM, flexibility minutes (90887) 38   Therapeutic Procedures Ther Proc 2;Ther Proc 3   Ther Proc 1 Directional Preference Assessment   Ther Proc 1 - Details Repeated Lumbar Flexion in Standing: 1x10 (NE/NE),  Prone Press Up: 1x10 (NE/NE), Prone Press Up with Right Side Glide (NE/NE),  Unilateral Right Side Press Up on edge of plinth 1x10 (Better / NE)   PTRx Ther Proc 1 Supine Alternating Knee Push with Marching   PTRx Ther Proc 1 - Details Reviewed but comfortable and will continue at home   PTRx Ther Proc 2 Quadruped Glute Kick Back with Theraband   PTRx Ther Proc 2 - Details Reviewed but comfortable and will continue at home   PTRx Ther Proc 3 Sidelying Hip Abduction with use of Resistance Band or Ankle Weights   PTRx Ther Proc 3 - Details Reviewed but comfortable and will continue at home   PTRx Ther Proc 4 Hip Flexion Straight Leg Raise   PTRx Ther Proc 4 - Details Reviewed but comfortable and will continue at home   PTRx Ther Proc 5 Single Leg Bridge   PTRx Ther Proc 5 - Details Reviewed but comfortable and will continue at home   Skilled Intervention Review of HEP and discharge procedure   Patient Response/Progress Ready for discharge   Plan   Home program See PTRx   Updates to plan of care Continue per POC   Plan for next session She has responded well from her recent chiropractic visits.  She has had some progress, but  minimal and slow progress with therapy.  Her pain has been abolished on the left, but still has a general ache in the back with right hip flexor numbness.  She will transition to independent management with continued performance of the HEP at home   Total Session Time   Timed Code Treatment Minutes 38   Total Treatment Time (sum of timed and untimed services) 38           DISCHARGE  Reason for Discharge: No further expectation of progress.    Equipment Issued: None    Discharge Plan: Patient to continue home program.    Referring Provider:  Jan Rowe

## 2024-08-25 DIAGNOSIS — L40.9 PSORIASIS: ICD-10-CM

## 2024-08-25 DIAGNOSIS — N32.81 OVERACTIVE BLADDER: ICD-10-CM

## 2024-08-25 RX ORDER — CLOBETASOL PROPIONATE 0.5 MG/G
CREAM TOPICAL
Qty: 60 G | Refills: 3 | Status: SHIPPED | OUTPATIENT
Start: 2024-08-25

## 2024-08-25 RX ORDER — TOLTERODINE TARTRATE 2 MG/1
2 TABLET, EXTENDED RELEASE ORAL 2 TIMES DAILY
Qty: 180 TABLET | Refills: 3 | Status: SHIPPED | OUTPATIENT
Start: 2024-08-25

## 2024-08-27 ENCOUNTER — MYC MEDICAL ADVICE (OUTPATIENT)
Dept: NEUROSURGERY | Facility: CLINIC | Age: 59
End: 2024-08-27
Payer: COMMERCIAL

## 2024-08-28 DIAGNOSIS — M54.16 LUMBAR RADICULOPATHY: Primary | ICD-10-CM

## 2024-08-29 NOTE — TELEPHONE ENCOUNTER
Patient returned call and will get MRI done at [facility]. MRI Orders and demographics faxed to [facility] [phone]

## 2024-08-29 NOTE — TELEPHONE ENCOUNTER
I called patient and LM for her to call me back and discuss message below. Pt will need to go outside of our system to get MRI completed B4 Follow-up appt on 9/11.

## 2024-08-29 NOTE — PROGRESS NOTES
"  Subjective:    Brittany Corbett is a 59 year old female who presents today for follow-up regarding   Pelvic Joint Dysfunction manifesting as a recurrent left anterior innominate rotation, plus a left posterior sacral torsion-encouraging response to OMT which is something that her chiropractor has not yet done.  Sensory deficit sit in a right L1 distribution, and I am hoping that this is a \"Jenga\" syndrome which will resolve after the pelvis has been straightened out  Incidental painless right lower abdominal ventral hernia  Incidental asymptomatic and pre-existing perineural cysts.  7/30/2024 plan:  Continue doing her home exercises.  Continue with her chiropractor and inform him what I did.  Recheck in 6 weeks and if she still has a sensory deficit in about 4  Weeks send me a [employer] message so I can order an MRI before her follow-up visit.  Continue all her home exercises in the meantime.  She will see her family doctor regarding the ventral hernia.    PRIOR HISTORY from 6/12/2024:  She was seen for evaluation of nonradiating low back pain and perineural cyst upon referral from MONIK Sanchez, whose 6/6/2024 office note records:  \" Hurts with things that should help like stretching, exercising, foam rolling     More active she is the more it hurts     Does yoga every day but parts of it hurt now     Can't massage it   - Feels different then previous, feels deep inside      - 10 years ago went to Chiro, so this time went back and it is not getting better   - Chiro order MRI   - No sciatic pain   - Normal bowel and bladder function   - Both sides but more left    Patient with history of self limited, mostly musculoskeletal, with 6 months of back pain that is not improving with conservative measures including chiropractic care   - Chiro ordered MRI, while I don't have the images, the report was faxed to me and we reviewed this, MRI shows perineural cyst   - Discussed what this means and recommend " "referral to spine to discuss options for further treatment since not improving\"        Brittany confirms the above history.  She had an MRI 10 years ago.  She recovered with a combination of physical therapy, home exercise and chiropractic care.  6 months ago the current episode began without an inciting event and exercises that are designed to strengthen her back such as bird dogs and MDT seem to make it worse.  Interestingly, while not changing any of her approach, recently the pain started to improve.  There is no leg pain,  no numbness tingling or weakness, no bowel or bladder dysfunction or saddle anesthesia.  At 1 time she was having some left-sided hemipelvic pain but that is pretty much gone.  Has been doing a lot of foam rolling and stretching and yoga.     7/30/2024 HISTORY:    Brittany has a different set of symptoms.  Her pelvis has basically gotten better.  2 weeks ago she was bending over to clean her car and then took a long car ride home and she was so sore that on 7/14/2024 she went to the emergency department where they reported 2 days of pain.  She was noticing the pain higher up in her back and radiating down her right anterior thigh and she developed a sensory deficit in roughly an L1-L2 distribution but not into the genital area.  No weakness bowel or bladder dysfunction.  She required some morphine, IV prednisone, Toradol and methocarbamol in the emergency department and they discharged her with methocarbamol and a Medrol Dosepak.  A couple days later she went to see a chiropractor and subsequently a massage and they noticed tightness in her right paralumbar right glut and right IT band and hip flexors.  She saw a different chiropractor yesterday who started working more on her pelvis.  That seems to be helping.  She saw Wilfred in PT and has been doing her home self-correction and exercises every day.  Accompanying all of this 2 weeks worth of symptoms as she has noticed a slight bulge that is " "painless in her right suprapubic area and she feels like she has to hold it when she coughs.  She has an appointment to see her family doctor about that.  Her chiropractor also said something about a ligament injury in her inguinal area and talked about PRP.  We discussed that.    PRIOR HISTORY from   :  Brittany's pelvis is feeling much better.  Her chiropractor is adjusting her by having her do some gravity positioning and wedges as well as some adjustments and is looking at doing some x-rays to assess leg length discrepancy.  The numbness in the right inguinal area persists but there is no pain there.  There is no weakness in her legs no bowel or bladder dysfunction.  She has tight right hip flexors.    INTERIM HISTORY:  8/27/2024 [employer] message as follows: \"I am still experiencing numbness in my front, right side from the pubic bone toward my hip bone and upper thigh. It's improved some, but hasn't gone away completely. I continue to do my PT exercises, and am seeing my chiropractor - who has brought considerable relief in my back and pelvis. He is in agreement with you about having a rotated and tilted pelvis. You were considering ordering an MRI before my next visit on Sept. 11th.\"  I ordered a lumbar MRI looking for right L1 radicular impingement.  She has persistent numbness in the right inguinal area but not into the introitus and there is normal sensation with intercourse, normal bowel and bladder function and no specific leg weakness but her right hip flexors have been noted to be \"tight\".    PAST MEDICAL/SURGICAL HISTORY:   Pertinent for psoriasis, overactive bladder, 1991, 1993 laparoscopy for infertility     SOCIAL HX: She is an  at a primary school and she lives in [place].  She and her spouse have 2 children and no grandchildren.  Sports hobbies and activities, biking, yoga, stretching, distance training, country skiing, hiking, snowshoeing, outdoor " activities    Objective:    IMAGING:   Images and report reviewed    MR LUMBAR SPINE WITHOUT CONTRAST 9/6/2024 ([facility])    L5-S1: Mild spondylosis, shallow disc bulge, hypertrophic facet degeneration and mild subarticular impingement on right/contacts left S1 roots. Ganglia exit without impingement and unremarkable facet joints.    L4-5: Normal disc height, moderate central and subarticular stenosis with facet hypertrophy and shallow bulge. Mild right foraminal narrowing where osteophyte and bulge underlie the ganglion. Patent left nerve root canal. Mild developmental AP central canal narrowing.    L3-4: Moderate degeneration, moderate to severe central and subarticular stenosis with bulge, facet hypertrophy and short pedicles. Mild left foraminal narrowing and patent right nerve root canal.    L1-2: Disc degeneration, mild central stenosis and right foraminal high signal intensity T2/STIR soft tissue mass extends extraforaminal with compression of the L1 ganglion and proximal nerve. This measures 7.7 mm x 11 mm in greatest dimension and most consistent with disc extrusion. See axial images 31 and sagittal T1 image 5 series 6. Patent left nerve root canal and unremarkable facet joints.    T12-L1: Severe degenerative disc space narrowing, partially calcified disc protrusion indents dural sac, mild central stenosis and near cord contact. Patent foramina.      CONCLUSION: Multilevel spondylosis, no acute or subacute fractures and significant most findings are as follows:    1. Right foraminal/extraforaminal disc extrusion at L1-2 impinges the L1 ganglion and proximal nerve.    2. Acquired and developmental central canal stenosis is moderate to severe at L3-4, moderate at L4-5 and mild at T12 L1/L1 2.  (OM-the [facility] radiologist did not have access to the 122 15M [facility] lumbar MRI but I compared the 2 studies and there was a L1-2 disc herniation in the right lateral recess with minimal foraminal involvement.  The  current disc is larger and there is more foraminal extrusion with compression of the right L1 nerve root.)      MRI OF THE PELVIS, WITHOUT CONTRAST ([facility]) 5/16/2024     COMPARISONS: None available.     Osseous structures of the bony pelvis are intact and unremarkable.     The sacroiliac joints appear unremarkable.     The symphysis pubis appears unremarkable.     No evidence of myotendinous injury.     No convincing pelvic mass identified as visualized, however evaluation is limited.     Small sacral perineural cyst formation is noted as seen on coronal series 7 image 7-11 and axial series 11 images 11-15.     _________________________________________________________     IMPRESSION:  1. No evidence of pelvic bone marrow edema, fracture or myotendinous injury.  2. Unremarkable appearance of the sacroiliac joints.  3. Small sacral perineural cyst formation.  (OM-I compared these images to the 1/22/2015 images from [facility], not available to the radiologist from [facility], and these perineural cysts are unchanged and were present previously.)    EXAMINATION:    CONSTITUTIONAL:  Vital signs as above.  No acute distress.  The patient is well nourished and well groomed.    PSYCHIATRIC:  The patient is awake, alert, oriented to person, place and time.  The patient is answering questions appropriately with clear speech.  Normal affect.  Able to follow commands  She has intact sensation in the leg with the exception of the right anterior medial thigh..  She has 5/5 strength with hip flexion test this very forcefully.    Assessment     Brittany Corbett  is a 59 year old y.o. female who presents today for follow-up regarding   Pelvic Joint Dysfunction manifesting as a recurrent left anterior innominate rotation, plus a left posterior sacral torsion-encouraging response to OMT which is something that her chiropractor has not yet done.  Right L1 to foraminal disc herniation with sensory deficit, hip flexor tightness, but no  weakness and no other symptoms.  Compared to her 1/22/2015 MRI, this is a larger disc herniation with more foraminal compression.  Incidental painless right lower abdominal ventral hernia  Incidental asymptomatic and pre-existing perineural cysts.      Plan:  We talked about right L1 to TFESI.  She wants to think about it but is inclined to proceed.  She does not really have surgical indications.  The main symptom that I would be looking to improve would be the tight sensation in her right iliopsoas.  The numbness may improve but that is less predictable and that can take 6 to 12 months.  We talked about risks and benefits and side effects.  We also talked about the perineural cysts in her sacral area (Tarlov cysts).  These are asymptomatic and do not require treatment.  The other areas on her lumbar MRI are asymptomatic and also do not require treatment.  This is a relatively recent symptom complex since July so we could do 2 or 3 epidurals if necessary.      25 minutes of time spent doing chart review, history and exam, documentation, counseling, education, coordination of care, and other activities as described above.        Advised patient to call or return early if symptoms worsen, or having problems controlling bladder and bowel function or worsening leg weakness.     Please note: Voice recognition software was used in this dictation.  It may therefore contain typographical errors.  Jan Rowe MD

## 2024-09-11 ENCOUNTER — OFFICE VISIT (OUTPATIENT)
Dept: NEUROSURGERY | Facility: CLINIC | Age: 59
End: 2024-09-11
Payer: COMMERCIAL

## 2024-09-11 VITALS
SYSTOLIC BLOOD PRESSURE: 115 MMHG | HEART RATE: 65 BPM | DIASTOLIC BLOOD PRESSURE: 70 MMHG | BODY MASS INDEX: 20.73 KG/M2 | HEIGHT: 63 IN | WEIGHT: 117 LBS

## 2024-09-11 DIAGNOSIS — M54.16 LUMBAR RADICULOPATHY: Primary | ICD-10-CM

## 2024-09-11 DIAGNOSIS — M51.26 HERNIATED LUMBAR DISC WITHOUT MYELOPATHY: ICD-10-CM

## 2024-09-11 DIAGNOSIS — G96.191 PERINEURAL CYST: ICD-10-CM

## 2024-09-11 DIAGNOSIS — M99.05 SOMATIC DYSFUNCTION OF PELVIS REGION: ICD-10-CM

## 2024-09-11 PROCEDURE — 99214 OFFICE O/P EST MOD 30 MIN: CPT | Performed by: PREVENTIVE MEDICINE

## 2024-09-11 ASSESSMENT — PAIN SCALES - GENERAL: PAINLEVEL: NO PAIN (0)

## 2024-09-11 NOTE — NURSING NOTE
"Reason For Visit:   Chief Complaint   Patient presents with    RECHECK     MRI results         Occupation: Admin assitant  Currently working? Yes.  Work status?  Full time.     Sports: n  Activities: walking,yoga           /70   Pulse 65   Ht 1.6 m (5' 3\")   Wt 53.1 kg (117 lb)   LMP  (LMP Unknown)   BMI 20.73 kg/m        Allergies   Allergen Reactions    No Known Drug Allergy        Current Outpatient Medications   Medication Sig Dispense Refill    clobetasol propionate (TEMOVATE) 0.05 % external cream APPLY TOPICALLY TO THE AFFECTED RASH AREA(S) TWICE DAILY AS NEEDED AS DIRECTED 60 g 3    fluocinonide (LIDEX) 0.05 % external solution APPLY TO THE SCALP ONCE DAILY 60 mL 11    fluticasone (FLONASE) 50 MCG/ACT nasal spray Spray 2 sprays into both nostrils daily 1 Package 12    tolterodine (DETROL) 2 MG tablet TAKE ONE TABLET BY MOUTH TWICE A  tablet 3     No current facility-administered medications for this visit.         Darla Severin-Brown, LPN   "

## 2024-09-11 NOTE — LETTER
"9/11/2024      Brittany Corbett  [address] [facility] [address]      Dear Colleague,    Thank you for referring your patient, Brittany Corbett, to the [facility]. Please see a copy of my visit note below.      Subjective:    Brittany Corbett is a 59 year old female who presents today for follow-up regarding   Pelvic Joint Dysfunction manifesting as a recurrent left anterior innominate rotation, plus a left posterior sacral torsion-encouraging response to OMT which is something that her chiropractor has not yet done.  Sensory deficit sit in a right L1 distribution, and I am hoping that this is a \"Jenga\" syndrome which will resolve after the pelvis has been straightened out  Incidental painless right lower abdominal ventral hernia  Incidental asymptomatic and pre-existing perineural cysts.  7/30/2024 plan:  Continue doing her home exercises.  Continue with her chiropractor and inform him what I did.  Recheck in 6 weeks and if she still has a sensory deficit in about 4  Weeks send me a [employer] message so I can order an MRI before her follow-up visit.  Continue all her home exercises in the meantime.  She will see her family doctor regarding the ventral hernia.    PRIOR HISTORY from 6/12/2024:  She was seen for evaluation of nonradiating low back pain and perineural cyst upon referral from MONIK Sanchez, whose 6/6/2024 office note records:  \" Hurts with things that should help like stretching, exercising, foam rolling     More active she is the more it hurts     Does yoga every day but parts of it hurt now     Can't massage it   - Feels different then previous, feels deep inside      - 10 years ago went to Chiro, so this time went back and it is not getting better   - Chiro order MRI   - No sciatic pain   - Normal bowel and bladder function   - Both sides but more left    Patient with history of self limited, mostly musculoskeletal, with 6 months of " "back pain that is not improving with conservative measures including chiropractic care   - Chiro ordered MRI, while I don't have the images, the report was faxed to me and we reviewed this, MRI shows perineural cyst   - Discussed what this means and recommend referral to spine to discuss options for further treatment since not improving\"        Brittany confirms the above history.  She had an MRI 10 years ago.  She recovered with a combination of physical therapy, home exercise and chiropractic care.  6 months ago the current episode began without an inciting event and exercises that are designed to strengthen her back such as bird dogs and MDT seem to make it worse.  Interestingly, while not changing any of her approach, recently the pain started to improve.  There is no leg pain,  no numbness tingling or weakness, no bowel or bladder dysfunction or saddle anesthesia.  At 1 time she was having some left-sided hemipelvic pain but that is pretty much gone.  Has been doing a lot of foam rolling and stretching and yoga.     7/30/2024 HISTORY:    Brittany has a different set of symptoms.  Her pelvis has basically gotten better.  2 weeks ago she was bending over to clean her car and then took a long car ride home and she was so sore that on 7/14/2024 she went to the emergency department where they reported 2 days of pain.  She was noticing the pain higher up in her back and radiating down her right anterior thigh and she developed a sensory deficit in roughly an L1-L2 distribution but not into the genital area.  No weakness bowel or bladder dysfunction.  She required some morphine, IV prednisone, Toradol and methocarbamol in the emergency department and they discharged her with methocarbamol and a Medrol Dosepak.  A couple days later she went to see a chiropractor and subsequently a massage and they noticed tightness in her right paralumbar right glut and right IT band and hip flexors.  She saw a different chiropractor " "yesterday who started working more on her pelvis.  That seems to be helping.  She saw Wilfred in PT and has been doing her home self-correction and exercises every day.  Accompanying all of this 2 weeks worth of symptoms as she has noticed a slight bulge that is painless in her right suprapubic area and she feels like she has to hold it when she coughs.  She has an appointment to see her family doctor about that.  Her chiropractor also said something about a ligament injury in her inguinal area and talked about PRP.  We discussed that.    PRIOR HISTORY from   :  Brittany's pelvis is feeling much better.  Her chiropractor is adjusting her by having her do some gravity positioning and wedges as well as some adjustments and is looking at doing some x-rays to assess leg length discrepancy.  The numbness in the right inguinal area persists but there is no pain there.  There is no weakness in her legs no bowel or bladder dysfunction.  She has tight right hip flexors.    INTERIM HISTORY:  8/27/2024 [employer] message as follows: \"I am still experiencing numbness in my front, right side from the pubic bone toward my hip bone and upper thigh. It's improved some, but hasn't gone away completely. I continue to do my PT exercises, and am seeing my chiropractor - who has brought considerable relief in my back and pelvis. He is in agreement with you about having a rotated and tilted pelvis. You were considering ordering an MRI before my next visit on Sept. 11th.\"  I ordered a lumbar MRI looking for right L1 radicular impingement.  She has persistent numbness in the right inguinal area but not into the introitus and there is normal sensation with intercourse, normal bowel and bladder function and no specific leg weakness but her right hip flexors have been noted to be \"tight\".    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  She and her spouse have 2 children and no grandchildren.  Sports hobbies and activities, biking, yoga, stretching, distance training, country skiing, hiking, snowshoeing, outdoor activities    Objective:    IMAGING:   Images and report reviewed    MR LUMBAR SPINE WITHOUT CONTRAST 9/6/2024 ([facility])    L5-S1: Mild spondylosis, shallow disc bulge, hypertrophic facet degeneration and mild subarticular impingement on right/contacts left S1 roots. Ganglia exit without impingement and unremarkable facet joints.    L4-5: Normal disc height, moderate central and subarticular stenosis with facet hypertrophy and shallow bulge. Mild right foraminal narrowing where osteophyte and bulge underlie the ganglion. Patent left nerve root canal. Mild developmental AP central canal narrowing.    L3-4: Moderate degeneration, moderate to severe central and subarticular stenosis with bulge, facet hypertrophy and short pedicles. Mild left foraminal narrowing and patent right nerve root canal.    L1-2: Disc degeneration, mild central stenosis and right foraminal high signal intensity T2/STIR soft tissue mass extends extraforaminal with compression of the L1 ganglion and proximal nerve. This measures 7.7 mm x 11 mm in greatest dimension and most consistent with disc extrusion. See axial images 31 and sagittal T1 image 5 series 6. Patent left nerve root canal and unremarkable facet joints.    T12-L1: Severe degenerative disc space narrowing, partially calcified disc protrusion indents dural sac, mild central stenosis and near cord contact. Patent foramina.      CONCLUSION: Multilevel spondylosis, no acute or subacute fractures and significant most findings are as follows:    1. Right foraminal/extraforaminal disc extrusion at L1-2 impinges the L1 ganglion and proximal nerve.    2. Acquired and developmental central canal stenosis is moderate to severe at L3-4, moderate at L4-5 and mild  at T12 L1/L1 2.  (OM-the [facility] radiologist did not have access to the 122 15M [facility] lumbar MRI but I compared the 2 studies and there was a L1-2 disc herniation in the right lateral recess with minimal foraminal involvement.  The current disc is larger and there is more foraminal extrusion with compression of the right L1 nerve root.)      MRI OF THE PELVIS, WITHOUT CONTRAST ([facility]) 5/16/2024     COMPARISONS: None available.     Osseous structures of the bony pelvis are intact and unremarkable.     The sacroiliac joints appear unremarkable.     The symphysis pubis appears unremarkable.     No evidence of myotendinous injury.     No convincing pelvic mass identified as visualized, however evaluation is limited.     Small sacral perineural cyst formation is noted as seen on coronal series 7 image 7-11 and axial series 11 images 11-15.     _________________________________________________________     IMPRESSION:  1. No evidence of pelvic bone marrow edema, fracture or myotendinous injury.  2. Unremarkable appearance of the sacroiliac joints.  3. Small sacral perineural cyst formation.  (OM-I compared these images to the 1/22/2015 images from [place], not available to the radiologist from [facility], and these perineural cysts are unchanged and were present previously.)    EXAMINATION:    CONSTITUTIONAL:  Vital signs as above.  No acute distress.  The patient is well nourished and well groomed.    PSYCHIATRIC:  The patient is awake, alert, oriented to person, place and time.  The patient is answering questions appropriately with clear speech.  Normal affect.  Able to follow commands  She has intact sensation in the leg with the exception of the right anterior medial thigh..  She has 5/5 strength with hip flexion test this very forcefully.    Assessment     Brittany Corbett  is a 59 year old y.o. female who presents today for follow-up regarding   Pelvic Joint Dysfunction manifesting as a recurrent left anterior  innominate rotation, plus a left posterior sacral torsion-encouraging response to OMT which is something that her chiropractor has not yet done.  Right L1 to foraminal disc herniation with sensory deficit, hip flexor tightness, but no weakness and no other symptoms.  Compared to her 1/22/2015 MRI, this is a larger disc herniation with more foraminal compression.  Incidental painless right lower abdominal ventral hernia  Incidental asymptomatic and pre-existing perineural cysts.      Plan:  We talked about right L1 to TFESI.  She wants to think about it but is inclined to proceed.  She does not really have surgical indications.  The main symptom that I would be looking to improve would be the tight sensation in her right iliopsoas.  The numbness may improve but that is less predictable and that can take 6 to 12 months.  We talked about risks and benefits and side effects.  We also talked about the perineural cysts in her sacral area (Tarlov cysts).  These are asymptomatic and do not require treatment.  The other areas on her lumbar MRI are asymptomatic and also do not require treatment.  This is a relatively recent symptom complex since July so we could do 2 or 3 epidurals if necessary.      25 minutes of time spent doing chart review, history and exam, documentation, counseling, education, coordination of care, and other activities as described above.        Advised patient to call or return early if symptoms worsen, or having problems controlling bladder and bowel function or worsening leg weakness.     Please note: Voice recognition software was used in this dictation.  It may therefore contain typographical errors.  Jan Rowe MD      Again, thank you for allowing me to participate in the care of your patient.        Sincerely,        Jan Rowe MD

## 2024-09-11 NOTE — PATIENT INSTRUCTIONS
Brittany I recommend doing an injection on the right at L1-2 to see if we can get that hip flexor tightness to improve.  I will have Dr. Gallardo's office call you and you can decide whether you wish to proceed or not.  The main reason for doing that would be to improve the hip flexor tightness which I think is a result of that nerve being pinched.  If you have 100% or 0% response then I would stop.  If you have a partial improvement we can consider 1 or 2 more injections.  See the assessment and plan below for further details of our conversation today    Assessment     Brittany Corbett  is a 59 year old y.o. female who presents today for follow-up regarding   Pelvic Joint Dysfunction manifesting as a recurrent left anterior innominate rotation, plus a left posterior sacral torsion-encouraging response to OMT which is something that her chiropractor has not yet done.  Right L1 to foraminal disc herniation with sensory deficit, hip flexor tightness, but no weakness and no other symptoms.  Compared to her 1/22/2015 MRI, this is a larger disc herniation with more foraminal compression.  Incidental painless right lower abdominal ventral hernia  Incidental asymptomatic and pre-existing perineural cysts.      Plan:  We talked about right L1 to TFESI.  She wants to think about it but is inclined to proceed.  She does not really have surgical indications.  The main symptom that I would be looking to improve would be the tight sensation in her right iliopsoas.  The numbness may improve but that is less predictable and that can take 6 to 12 months.  We talked about risks and benefits and side effects.  We also talked about the perineural cysts in her sacral area (Tarlov cysts).  These are asymptomatic and do not require treatment.  The other areas on her lumbar MRI are asymptomatic and also do not require treatment.  This is a relatively recent symptom complex since July so we could do 2 or 3 epidurals if necessary.

## 2024-09-12 ENCOUNTER — PATIENT OUTREACH (OUTPATIENT)
Dept: CARE COORDINATION | Facility: CLINIC | Age: 59
End: 2024-09-12

## 2024-09-12 ENCOUNTER — OFFICE VISIT (OUTPATIENT)
Dept: SURGERY | Facility: OTHER | Age: 59
End: 2024-09-12
Attending: PHYSICIAN ASSISTANT
Payer: COMMERCIAL

## 2024-09-12 ENCOUNTER — TELEPHONE (OUTPATIENT)
Dept: PHYSICAL MEDICINE AND REHAB | Facility: CLINIC | Age: 59
End: 2024-09-12

## 2024-09-12 VITALS
TEMPERATURE: 98.2 F | SYSTOLIC BLOOD PRESSURE: 112 MMHG | HEIGHT: 63 IN | HEART RATE: 76 BPM | WEIGHT: 120 LBS | DIASTOLIC BLOOD PRESSURE: 62 MMHG | BODY MASS INDEX: 21.26 KG/M2 | OXYGEN SATURATION: 96 %

## 2024-09-12 DIAGNOSIS — M54.16 LUMBAR RADICULOPATHY: Primary | ICD-10-CM

## 2024-09-12 DIAGNOSIS — K43.9 VENTRAL HERNIA WITHOUT OBSTRUCTION OR GANGRENE: ICD-10-CM

## 2024-09-12 DIAGNOSIS — R19.09 BULGE IN GROIN AREA: Primary | ICD-10-CM

## 2024-09-12 DIAGNOSIS — M51.26 HERNIATED LUMBAR DISC WITHOUT MYELOPATHY: ICD-10-CM

## 2024-09-12 PROCEDURE — 99243 OFF/OP CNSLTJ NEW/EST LOW 30: CPT | Performed by: SURGERY

## 2024-09-12 ASSESSMENT — PAIN SCALES - GENERAL: PAINLEVEL: NO PAIN (0)

## 2024-09-12 NOTE — TELEPHONE ENCOUNTER
Phoned the patient and left VM. Stated to call the pain clinic to schedule injection procedure at [phone].

## 2024-09-12 NOTE — LETTER
9/12/2024      Brittany Corbett  [address] [facility] [address]      Dear Colleague,    Thank you for referring your patient, Brittany Corbett, to the [facility]. Please see a copy of my visit note below.    Patient seen in consultation for right groin bulge by Camila Sanchez    HPI:  Patient is a 59 year old female with recent history of significant back spasms and some ongoing numbness in the right groin and anterior medial thigh.  She is working with occupational therapy, chiropractic he and primary care for these issues.  During the evaluation for this issue she was noted to have an asymptomatic bulge in the right groin area.  She denies any pain in this area.  She denies obstructive symptoms.  She has had a laparoscopy in the past.  The bulge is not currently limiting her ability to do any of her normal daily activities or her job.  She is not a smoker and not a diabetic.  Weight stable.    Review Of Systems    Skin: negative  Ears/Nose/Throat: negative  Respiratory: No shortness of breath, dyspnea on exertion, cough, or hemoptysis  Cardiovascular: negative  Gastrointestinal: negative  Genitourinary: negative  Musculoskeletal: negative  Neurologic: negative  Hematologic/Lymphatic/Immunologic: negative  Endocrine: negative      Past Medical History:   Diagnosis Date     Psoriasis 09/23/2009       Past Surgical History:   Procedure Laterality Date     BIOPSY  06/01/2014    breast biopsy, right     COLONOSCOPY  07/21/2014    Procedure: COLONOSCOPY;  Surgeon: Duane, William Charles, MD;  Location: [facility] OR     COLONOSCOPY       COSMETIC SURGERY  1988    Birthmark removal - neck     EYE SURGERY  2019    Cataract Left eye     GYN SURGERY  1991, 1993    Laparoscopy - infertility related; D&C     HC TOOTH EXTRACTION W/FORCEP       HEAD & NECK SURGERY       ZZC NONSPECIFIC PROCEDURE      laparoscopy for infertility       Family History   Problem Relation Age of Onset      Diabetes Mother         type 2     Cancer Mother         multiple myeloma     Hypertension Mother      Other Cancer Mother         Multiple Myeloma     C.A.D. Father         age 55 for CABG, had second bypass as well     Lipids Father      Depression Father      Cardiovascular Father      Coronary Artery Disease Father      Hyperlipidemia Father      Cancer Paternal Grandmother         cervical     Neurologic Disorder Sister         MS     Breast Cancer Sister         11/2012     Breast Cancer Maternal Aunt      Breast Cancer Other         Cousin     Breast Cancer Cousin      Breast Cancer Sister      Anxiety Disorder Daughter      Thyroid Disease Daughter        Social History     Socioeconomic History     Marital status:      Spouse name: Not on file     Number of children: Not on file     Years of education: Not on file     Highest education level: Not on file   Occupational History     Not on file   Tobacco Use     Smoking status: Never     Smokeless tobacco: Never     Tobacco comments:     no smokers in household   Vaping Use     Vaping status: Never Used   Substance and Sexual Activity     Alcohol use: Yes     Comment: 1-2 per week     Drug use: No     Sexual activity: Yes     Partners: Male     Birth control/protection: Post-menopausal, None     Comment:  had vasectomy   Other Topics Concern     Parent/sibling w/ CABG, MI or angioplasty before 65F 55M? Yes     Comment: father at age 55 triple by pass   Social History Narrative     Not on file     Social Determinants of Health     Financial Resource Strain: Low Risk  (11/24/2023)    Financial Resource Strain      Within the past 12 months, have you or your family members you live with been unable to get utilities (heat, electricity) when it was really needed?: No   Food Insecurity: Low Risk  (11/24/2023)    Food Insecurity      Within the past 12 months, did you worry that your food would run out before you got money to buy more?: No      Within  "the past 12 months, did the food you bought just not last and you didn t have money to get more?: No   Transportation Needs: Low Risk  (11/24/2023)    Transportation Needs      Within the past 12 months, has lack of transportation kept you from medical appointments, getting your medicines, non-medical meetings or appointments, work, or from getting things that you need?: No   Physical Activity: Not on file   Stress: Not on file   Social Connections: Not on file   Interpersonal Safety: Not At Risk (7/14/2024)    Received from [facility]     Humiliation, Afraid, Rape, and Kick questionnaire      Fear of Current or Ex-Partner: No      Emotionally Abused: No      Physically Abused: No      Sexually Abused: No   Housing Stability: Low Risk  (11/24/2023)    Housing Stability      Do you have housing? : Yes      Are you worried about losing your housing?: No       Current Outpatient Medications   Medication Sig Dispense Refill     clobetasol propionate (TEMOVATE) 0.05 % external cream APPLY TOPICALLY TO THE AFFECTED RASH AREA(S) TWICE DAILY AS NEEDED AS DIRECTED 60 g 3     fluocinonide (LIDEX) 0.05 % external solution APPLY TO THE SCALP ONCE DAILY 60 mL 11     fluticasone (FLONASE) 50 MCG/ACT nasal spray Spray 2 sprays into both nostrils daily 1 Package 12     tolterodine (DETROL) 2 MG tablet TAKE ONE TABLET BY MOUTH TWICE A  tablet 3       Medications and history reviewed    Physical exam:  Vitals: /62   Pulse 76   Temp 98.2  F (36.8  C) (Temporal)   Ht 1.6 m (5' 3\")   Wt 54.4 kg (120 lb)   LMP  (LMP Unknown)   SpO2 96%   BMI 21.26 kg/m    BMI= Body mass index is 21.26 kg/m .    Constitutional: alert, non-distressed   Head: normo-cephalic, atraumatic   Cardiovascular: RRR  Respiratory: equal chest rise, good respiratory effort, no audible wheeze  Gastrointestinal: Soft, non-tender, non distended, no masses or hernias palpated  Groin: Fullness in the right groin area, does not really change " with Valsalva, area is soft without skin changes.  : deferred  Musculoskeletal: moves all extremities   Skin: no suspicious lesions or rashes   Psychiatric: mentation appears normal, affect appropriate   Patient able to get up on table without difficulty.    Labs show:  No results found for this or any previous visit (from the past 24 hour(s)).    Imaging shows:  No results found for this or any previous visit (from the past 744 hour(s)).     Assessment:     ICD-10-CM    1. Bulge in groin area  R19.09 US Hernia Evaluation      2. Ventral hernia without obstruction or gangrene  K43.9 Adult Gen Surg  Referral        Plan: Recommend ultrasound imaging with Valsalva for further evaluation of the area.  We briefly discussed surgical options for repair should a hernia be confirmed.  If this is only a diastases, she understands that surgery would not be indicated.  I will call her to discuss next steps after imaging study has been completed.    30 minutes spent by me on the date of the encounter doing chart review, history and exam, documentation and further activities per the note    Be Philippe DO      Again, thank you for allowing me to participate in the care of your patient.        Sincerely,        Be Philippe, DO

## 2024-09-12 NOTE — PROGRESS NOTES
Patient seen in consultation for right groin bulge by Camila Sanchez    HPI:  Patient is a 59 year old female with recent history of significant back spasms and some ongoing numbness in the right groin and anterior medial thigh.  She is working with occupational therapy, chiropractic he and primary care for these issues.  During the evaluation for this issue she was noted to have an asymptomatic bulge in the right groin area.  She denies any pain in this area.  She denies obstructive symptoms.  She has had a laparoscopy in the past.  The bulge is not currently limiting her ability to do any of her normal daily activities or her job.  She is not a smoker and not a diabetic.  Weight stable.    Review Of Systems    Skin: negative  Ears/Nose/Throat: negative  Respiratory: No shortness of breath, dyspnea on exertion, cough, or hemoptysis  Cardiovascular: negative  Gastrointestinal: negative  Genitourinary: negative  Musculoskeletal: negative  Neurologic: negative  Hematologic/Lymphatic/Immunologic: negative  Endocrine: negative      Past Medical History:   Diagnosis Date    Psoriasis 09/23/2009       Past Surgical History:   Procedure Laterality Date    BIOPSY  06/01/2014    breast biopsy, right    COLONOSCOPY  07/21/2014    Procedure: COLONOSCOPY;  Surgeon: Duane, William Charles, MD;  Location: [facility] OR    COLONOSCOPY      COSMETIC SURGERY  1988    Birthmark removal - neck    EYE SURGERY  2019    Cataract Left eye    GYN SURGERY  1991, 1993    Laparoscopy - infertility related; D&C    HC TOOTH EXTRACTION W/FORCEP      HEAD & NECK SURGERY      ZZC NONSPECIFIC PROCEDURE      laparoscopy for infertility       Family History   Problem Relation Age of Onset    Diabetes Mother         type 2    Cancer Mother         multiple myeloma    Hypertension Mother     Other Cancer Mother         Multiple Myeloma    C.A.D. Father         age 55 for CABG, had second bypass as well    Lipids Father     Depression Father      Cardiovascular Father     Coronary Artery Disease Father     Hyperlipidemia Father     Cancer Paternal Grandmother         cervical    Neurologic Disorder Sister         MS    Breast Cancer Sister         11/2012    Breast Cancer Maternal Aunt     Breast Cancer Other         Cousin    Breast Cancer Cousin     Breast Cancer Sister     Anxiety Disorder Daughter     Thyroid Disease Daughter        Social History     Socioeconomic History    Marital status:      Spouse name: Not on file    Number of children: Not on file    Years of education: Not on file    Highest education level: Not on file   Occupational History    Not on file   Tobacco Use    Smoking status: Never    Smokeless tobacco: Never    Tobacco comments:     no smokers in household   Vaping Use    Vaping status: Never Used   Substance and Sexual Activity    Alcohol use: Yes     Comment: 1-2 per week    Drug use: No    Sexual activity: Yes     Partners: Male     Birth control/protection: Post-menopausal, None     Comment:  had vasectomy   Other Topics Concern    Parent/sibling w/ CABG, MI or angioplasty before 65F 55M? Yes     Comment: father at age 55 triple by pass   Social History Narrative    Not on file     Social Determinants of Health     Financial Resource Strain: Low Risk  (11/24/2023)    Financial Resource Strain     Within the past 12 months, have you or your family members you live with been unable to get utilities (heat, electricity) when it was really needed?: No   Food Insecurity: Low Risk  (11/24/2023)    Food Insecurity     Within the past 12 months, did you worry that your food would run out before you got money to buy more?: No     Within the past 12 months, did the food you bought just not last and you didn t have money to get more?: No   Transportation Needs: Low Risk  (11/24/2023)    Transportation Needs     Within the past 12 months, has lack of transportation kept you from medical appointments, getting your medicines,  "non-medical meetings or appointments, work, or from getting things that you need?: No   Physical Activity: Not on file   Stress: Not on file   Social Connections: Not on file   Interpersonal Safety: Not At Risk (7/14/2024)    Received from [facility]     Humiliation, Afraid, Rape, and Kick questionnaire     Fear of Current or Ex-Partner: No     Emotionally Abused: No     Physically Abused: No     Sexually Abused: No   Housing Stability: Low Risk  (11/24/2023)    Housing Stability     Do you have housing? : Yes     Are you worried about losing your housing?: No       Current Outpatient Medications   Medication Sig Dispense Refill    clobetasol propionate (TEMOVATE) 0.05 % external cream APPLY TOPICALLY TO THE AFFECTED RASH AREA(S) TWICE DAILY AS NEEDED AS DIRECTED 60 g 3    fluocinonide (LIDEX) 0.05 % external solution APPLY TO THE SCALP ONCE DAILY 60 mL 11    fluticasone (FLONASE) 50 MCG/ACT nasal spray Spray 2 sprays into both nostrils daily 1 Package 12    tolterodine (DETROL) 2 MG tablet TAKE ONE TABLET BY MOUTH TWICE A  tablet 3       Medications and history reviewed    Physical exam:  Vitals: /62   Pulse 76   Temp 98.2  F (36.8  C) (Temporal)   Ht 1.6 m (5' 3\")   Wt 54.4 kg (120 lb)   LMP  (LMP Unknown)   SpO2 96%   BMI 21.26 kg/m    BMI= Body mass index is 21.26 kg/m .    Constitutional: alert, non-distressed   Head: normo-cephalic, atraumatic   Cardiovascular: RRR  Respiratory: equal chest rise, good respiratory effort, no audible wheeze  Gastrointestinal: Soft, non-tender, non distended, no masses or hernias palpated  Groin: Fullness in the right groin area, does not really change with Valsalva, area is soft without skin changes.  : deferred  Musculoskeletal: moves all extremities   Skin: no suspicious lesions or rashes   Psychiatric: mentation appears normal, affect appropriate   Patient able to get up on table without difficulty.    Labs show:  No results found for this or " any previous visit (from the past 24 hour(s)).    Imaging shows:  No results found for this or any previous visit (from the past 744 hour(s)).     Assessment:     ICD-10-CM    1. Bulge in groin area  R19.09 US Hernia Evaluation      2. Ventral hernia without obstruction or gangrene  K43.9 Adult Gen Surg  Referral        Plan: Recommend ultrasound imaging with Valsalva for further evaluation of the area.  We briefly discussed surgical options for repair should a hernia be confirmed.  If this is only a diastases, she understands that surgery would not be indicated.  I will call her to discuss next steps after imaging study has been completed.    30 minutes spent by me on the date of the encounter doing chart review, history and exam, documentation and further activities per the note    Be Philippe, DO

## 2024-09-13 ENCOUNTER — ANCILLARY PROCEDURE (OUTPATIENT)
Dept: ULTRASOUND IMAGING | Facility: OTHER | Age: 59
End: 2024-09-13
Attending: SURGERY
Payer: COMMERCIAL

## 2024-09-13 DIAGNOSIS — R19.09 BULGE IN GROIN AREA: ICD-10-CM

## 2024-09-13 PROCEDURE — 76705 ECHO EXAM OF ABDOMEN: CPT | Mod: TC | Performed by: RADIOLOGY

## 2024-09-16 ENCOUNTER — TELEPHONE (OUTPATIENT)
Dept: PHYSICAL MEDICINE AND REHAB | Facility: CLINIC | Age: 59
End: 2024-09-16
Payer: COMMERCIAL

## 2024-09-17 ENCOUNTER — TELEPHONE (OUTPATIENT)
Dept: PHYSICAL MEDICINE AND REHAB | Facility: CLINIC | Age: 59
End: 2024-09-17
Payer: COMMERCIAL

## 2024-09-17 PROBLEM — M51.26 HERNIATED LUMBAR DISC WITHOUT MYELOPATHY: Status: ACTIVE | Noted: 2024-09-12

## 2024-09-17 PROBLEM — M54.16 LUMBAR RADICULOPATHY: Status: ACTIVE | Noted: 2024-09-12

## 2024-09-17 NOTE — TELEPHONE ENCOUNTER
Called patient to schedule procedure with Dr. Gallardo    Date of Procedure: 11/15/24    Arrival time given: Yes: Arrival Time 2pm       Procedure Location: [facility]     Verified Location with Patient:  Yes  Address provided to the patient    Pre-op H&P Required:  No: Local anesthesia        Post-Op/Follow Up Appt:  Yes: 12/17/24 @8am       Informed patient they will need a  to drive them home:  Yes    Patients : Spouse     Patient is aware that pre-op RN from the procedure center will call 2-3 days prior to scheduled procedure to confirm arrival time and review any instructions:  Yes       Additional Comments: N/A        Nohemy Sheridan MA on 9/17/2024 at 10:24 AM      P: [phone]*

## 2024-09-26 ENCOUNTER — ANCILLARY PROCEDURE (OUTPATIENT)
Dept: CT IMAGING | Facility: CLINIC | Age: 59
End: 2024-09-26
Attending: SURGERY
Payer: COMMERCIAL

## 2024-09-26 DIAGNOSIS — K43.9 VENTRAL HERNIA WITHOUT OBSTRUCTION OR GANGRENE: ICD-10-CM

## 2024-09-26 DIAGNOSIS — R19.09 BULGE IN GROIN AREA: ICD-10-CM

## 2024-09-26 PROCEDURE — 74177 CT ABD & PELVIS W/CONTRAST: CPT | Mod: TC | Performed by: RADIOLOGY

## 2024-09-26 RX ORDER — IOPAMIDOL 755 MG/ML
73 INJECTION, SOLUTION INTRAVASCULAR ONCE
Status: COMPLETED | OUTPATIENT
Start: 2024-09-26 | End: 2024-09-26

## 2024-09-26 RX ADMIN — IOPAMIDOL 73 ML: 755 INJECTION, SOLUTION INTRAVASCULAR at 10:04

## 2024-10-01 ENCOUNTER — MYC MEDICAL ADVICE (OUTPATIENT)
Dept: SURGERY | Facility: OTHER | Age: 59
End: 2024-10-01
Payer: COMMERCIAL

## 2024-10-01 ENCOUNTER — MYC REFILL (OUTPATIENT)
Dept: FAMILY MEDICINE | Facility: OTHER | Age: 59
End: 2024-10-01
Payer: COMMERCIAL

## 2024-10-01 DIAGNOSIS — L40.9 PSORIASIS: ICD-10-CM

## 2024-10-02 RX ORDER — FLUOCINONIDE TOPICAL SOLUTION USP, 0.05% 0.5 MG/ML
SOLUTION TOPICAL
Qty: 60 ML | Refills: 11 | Status: SHIPPED | OUTPATIENT
Start: 2024-10-02

## 2024-10-10 ENCOUNTER — PATIENT OUTREACH (OUTPATIENT)
Dept: CARE COORDINATION | Facility: CLINIC | Age: 59
End: 2024-10-10
Payer: COMMERCIAL

## 2024-10-12 ENCOUNTER — HEALTH MAINTENANCE LETTER (OUTPATIENT)
Age: 59
End: 2024-10-12

## 2024-11-21 ENCOUNTER — ANCILLARY PROCEDURE (OUTPATIENT)
Dept: MAMMOGRAPHY | Facility: OTHER | Age: 59
End: 2024-11-21
Attending: PHYSICIAN ASSISTANT
Payer: COMMERCIAL

## 2024-11-21 DIAGNOSIS — Z12.31 VISIT FOR SCREENING MAMMOGRAM: ICD-10-CM

## 2024-12-03 ENCOUNTER — OFFICE VISIT (OUTPATIENT)
Dept: FAMILY MEDICINE | Facility: OTHER | Age: 59
End: 2024-12-03
Payer: COMMERCIAL

## 2024-12-03 VITALS
SYSTOLIC BLOOD PRESSURE: 106 MMHG | OXYGEN SATURATION: 98 % | DIASTOLIC BLOOD PRESSURE: 66 MMHG | TEMPERATURE: 97.2 F | BODY MASS INDEX: 21.44 KG/M2 | HEART RATE: 58 BPM | HEIGHT: 63 IN | RESPIRATION RATE: 14 BRPM | WEIGHT: 121 LBS

## 2024-12-03 DIAGNOSIS — Z00.00 ROUTINE GENERAL MEDICAL EXAMINATION AT A HEALTH CARE FACILITY: Primary | ICD-10-CM

## 2024-12-03 DIAGNOSIS — Z12.11 SCREEN FOR COLON CANCER: ICD-10-CM

## 2024-12-03 DIAGNOSIS — K59.01 SLOW TRANSIT CONSTIPATION: ICD-10-CM

## 2024-12-03 PROCEDURE — 99396 PREV VISIT EST AGE 40-64: CPT | Performed by: PHYSICIAN ASSISTANT

## 2024-12-03 SDOH — HEALTH STABILITY: PHYSICAL HEALTH: ON AVERAGE, HOW MANY MINUTES DO YOU ENGAGE IN EXERCISE AT THIS LEVEL?: 40 MIN

## 2024-12-03 SDOH — HEALTH STABILITY: PHYSICAL HEALTH: ON AVERAGE, HOW MANY DAYS PER WEEK DO YOU ENGAGE IN MODERATE TO STRENUOUS EXERCISE (LIKE A BRISK WALK)?: 2 DAYS

## 2024-12-03 ASSESSMENT — PATIENT HEALTH QUESTIONNAIRE - PHQ9
SUM OF ALL RESPONSES TO PHQ QUESTIONS 1-9: 0
10. IF YOU CHECKED OFF ANY PROBLEMS, HOW DIFFICULT HAVE THESE PROBLEMS MADE IT FOR YOU TO DO YOUR WORK, TAKE CARE OF THINGS AT HOME, OR GET ALONG WITH OTHER PEOPLE: NOT DIFFICULT AT ALL
SUM OF ALL RESPONSES TO PHQ QUESTIONS 1-9: 0

## 2024-12-03 ASSESSMENT — ANXIETY QUESTIONNAIRES
GAD7 TOTAL SCORE: 0
GAD7 TOTAL SCORE: 0
IF YOU CHECKED OFF ANY PROBLEMS ON THIS QUESTIONNAIRE, HOW DIFFICULT HAVE THESE PROBLEMS MADE IT FOR YOU TO DO YOUR WORK, TAKE CARE OF THINGS AT HOME, OR GET ALONG WITH OTHER PEOPLE: NOT DIFFICULT AT ALL
4. TROUBLE RELAXING: NOT AT ALL
1. FEELING NERVOUS, ANXIOUS, OR ON EDGE: NOT AT ALL
2. NOT BEING ABLE TO STOP OR CONTROL WORRYING: NOT AT ALL
6. BECOMING EASILY ANNOYED OR IRRITABLE: NOT AT ALL
5. BEING SO RESTLESS THAT IT IS HARD TO SIT STILL: NOT AT ALL
GAD7 TOTAL SCORE: 0
7. FEELING AFRAID AS IF SOMETHING AWFUL MIGHT HAPPEN: NOT AT ALL
3. WORRYING TOO MUCH ABOUT DIFFERENT THINGS: NOT AT ALL
7. FEELING AFRAID AS IF SOMETHING AWFUL MIGHT HAPPEN: NOT AT ALL
8. IF YOU CHECKED OFF ANY PROBLEMS, HOW DIFFICULT HAVE THESE MADE IT FOR YOU TO DO YOUR WORK, TAKE CARE OF THINGS AT HOME, OR GET ALONG WITH OTHER PEOPLE?: NOT DIFFICULT AT ALL

## 2024-12-03 ASSESSMENT — PAIN SCALES - PAIN ENJOYMENT GENERAL ACTIVITY SCALE (PEG)
PEG_TOTALSCORE: 1
INTERFERED_GENERAL_ACTIVITY: 1
INTERFERED_GENERAL_ACTIVITY: 1
AVG_PAIN_PASTWEEK: 1
AVG_PAIN_PASTWEEK: 1
INTERFERED_ENJOYMENT_LIFE: 1
INTERFERED_ENJOYMENT_LIFE: 1
PEG_TOTALSCORE: 1

## 2024-12-03 ASSESSMENT — SOCIAL DETERMINANTS OF HEALTH (SDOH): HOW OFTEN DO YOU GET TOGETHER WITH FRIENDS OR RELATIVES?: ONCE A WEEK

## 2024-12-03 ASSESSMENT — PAIN SCALES - GENERAL: PAINLEVEL_OUTOF10: NO PAIN (0)

## 2024-12-03 NOTE — PATIENT INSTRUCTIONS
Patient Education     Start a clear liquid diet after breakfast.  A clear liquid diet consists of soda, juices without pulp, broth, Jell-O, Popsicles, Italian ice, hard candies (if age appropriate).  Pretty much anything you can see through!!  (NO dairy products or solid food.)    You will need:  32 or 64 oz. of flavored Pedialyte or Gatorade (See Below)  One 255 gram bottle of Miralax  2 or 3 bisacodyl (Dulcolax) tablets     These are all available without prescription.      Around 12 Noon on the day of the clean out, mix the entire container of Miralax (255 gr) in 64 oz. (or half a container in 32 ounces) of Pedialyte or  Gatorade. Leave this Miralax mixture in the refrigerator for one hour to help the Miralax dissolve, and to help the mixture taste better.  Note, the dose we re suggesting is for a bowel  cleanout.   It is not the dose that is written on the bottle, which is designed for daily softening of stool.  We need this higher dose so that the cleanout will work.      Drink 8-12 oz. of the MiraLax-electrolyte solution mixture every 15-20 minutes until the entire 64 oz mixture is consumed.  It is very important to drink all 64 oz of the MiraLax-electrolyte solution.   Within 30 min of finishing the MiraLax-electrolyte solution mixture, take the 3 bisacodyl (Dulcolax) tablets with 8-12 oz. of clear liquid (these tabs can be crushed).  (Note that the package instructions may direct not to take more than two tablets at a time, but for this preparation take three).           Preventive Care Advice   This is general advice given by our system to help you stay healthy. However, your care team may have specific advice just for you. Please talk to your care team about your preventive care needs.  Nutrition  Eat 5 or more servings of fruits and vegetables each day.  Try wheat bread, brown rice and whole grain pasta (instead of white bread, rice, and pasta).  Get enough calcium and vitamin D. Check the label on foods  and aim for 100% of the RDA (recommended daily allowance).  Lifestyle  Exercise at least 150 minutes each week  (30 minutes a day, 5 days a week).  Do muscle strengthening activities 2 days a week. These help control your weight and prevent disease.  No smoking.  Wear sunscreen to prevent skin cancer.  Have a dental exam and cleaning every 6 months.  Yearly exams  See your health care team every year to talk about:  Any changes in your health.  Any medicines your care team has prescribed.  Preventive care, family planning, and ways to prevent chronic diseases.  Shots (vaccines)   HPV shots (up to age 26), if you've never had them before.  Hepatitis B shots (up to age 59), if you've never had them before.  COVID-19 shot: Get this shot when it's due.  Flu shot: Get a flu shot every year.  Tetanus shot: Get a tetanus shot every 10 years.  Pneumococcal, hepatitis A, and RSV shots: Ask your care team if you need these based on your risk.  Shingles shot (for age 50 and up)  General health tests  Diabetes screening:  Starting at age 35, Get screened for diabetes at least every 3 years.  If you are younger than age 35, ask your care team if you should be screened for diabetes.  Cholesterol test: At age 39, start having a cholesterol test every 5 years, or more often if advised.  Bone density scan (DEXA): At age 50, ask your care team if you should have this scan for osteoporosis (brittle bones).  Hepatitis C: Get tested at least once in your life.  STIs (sexually transmitted infections)  Before age 24: Ask your care team if you should be screened for STIs.  After age 24: Get screened for STIs if you're at risk. You are at risk for STIs (including HIV) if:  You are sexually active with more than one person.  You don't use condoms every time.  You or a partner was diagnosed with a sexually transmitted infection.  If you are at risk for HIV, ask about PrEP medicine to prevent HIV.  Get tested for HIV at least once in your  life, whether you are at risk for HIV or not.  Cancer screening tests  Cervical cancer screening: If you have a cervix, begin getting regular cervical cancer screening tests starting at age 21.  Breast cancer scan (mammogram): If you've ever had breasts, begin having regular mammograms starting at age 40. This is a scan to check for breast cancer.  Colon cancer screening: It is important to start screening for colon cancer at age 45.  Have a colonoscopy test every 10 years (or more often if you're at risk) Or, ask your provider about stool tests like a FIT test every year or Cologuard test every 3 years.  To learn more about your testing options, visit:   .  For help making a decision, visit:   https://bit.ly/af89780.  Prostate cancer screening test: If you have a prostate, ask your care team if a prostate cancer screening test (PSA) at age 55 is right for you.  Lung cancer screening: If you are a current or former smoker ages 50 to 80, ask your care team if ongoing lung cancer screenings are right for you.  For informational purposes only. Not to replace the advice of your health care provider. Copyright   2023 [employer]. All rights reserved. Clinically reviewed by the [facility] Transitions Program. [employer] [id number] - REV 01/24.

## 2024-12-03 NOTE — PROGRESS NOTES
Preventive Care Visit  [facility]  Camila Sanchez PA-C, Family Medicine  Dec 3, 2024    Assessment & Plan     ICD-10-CM    1. Routine general medical examination at a health care facility  Z00.00       2. Screen for colon cancer  Z12.11 Colonoscopy Screening  Referral      3. Slow transit constipation  K59.01         1/2. Annual Visit/Screening  Routine health maintenance was discussed with the patient. Patient declined a COVID-19 and Influenza vaccination. A referral for routine colonoscopy was placed and patient will schedule.    3. Constipation  Patient has been experiencing increased constipation due to their chronic low back pain. They have tried increasing water and fiber intake which has helped constipation some, but not completely. Routine bowel clean out with the use of Miralax and Pedialyte/Gatorade was discussed with the patient and they expressed their interest in possibly doing this at home. See patient instructions for dosing instructions.    Patient has been advised of split billing requirements and indicates understanding: Yes    Counseling  Appropriate preventive services were addressed with this patient via screening, questionnaire, or discussion as appropriate for fall prevention, nutrition, physical activity, Tobacco-use cessation, social engagement, weight loss and cognition.  Checklist reviewing preventive services available has been given to the patient.  Reviewed patient's diet, addressing concerns and/or questions.   She is at risk for lack of exercise and has been provided with information to increase physical activity for the benefit of her well-being.       Review of the result(s) of each unique test - See list         12/1/23 - all labs        11/21/24 - mammogram       7/21/24 - colonoscopy   Diagnosis or treatment significantly limited by social determinants of health - None     20 minutes spent on the date of the encounter doing chart  review, history and exam, documentation and further activities as noted above    The patient indicates understanding of these issues and agrees with the plan.    Follow up: 1 year or PRN     I, Kevan Sanchez PA-C,  was present with the PA student who participated in the service and in the documentation of the note.  I have verified the history and personally performed the physical exam and medical decision making.  I agree with the assessment and plan of care as documented in the note.     DESMOND RenoS   [facility]     Kevan Sanchez PA-C  [facility]   Brittany is a 59 year old, presenting for the following:  Physical        12/3/2024     4:03 PM   Additional Questions   Roomed by loretta   Accompanied by self      HPI  Brittany is a 59-year-old presenting for routine health visit. Patient states she does not have a hernia via the CT scan she got three months ago. Patient states chiropractor and PT have helped with the back pain some but pain is still present. However, she decided to not receive a back injection due to the recent decreased pain. She noted some increased constipation due to the back pain but increased water and fiber intake has helped that some.    Health Care Directive  Patient does not have a Health Care Directive: Discussed advance care planning with patient; however, patient declined at this time.      12/3/2024   General Health   How would you rate your overall physical health? Excellent   Feel stress (tense, anxious, or unable to sleep) Not at all            12/3/2024   Nutrition   Three or more servings of calcium each day? Yes   Diet: Gluten-free/reduced    Other   If other, please elaborate: Dairy and egg free   How many servings of fruit and vegetables per day? 4 or more   How many sweetened beverages each day? 0-1       Multiple values from one day are sorted in reverse-chronological order         12/3/2024    Exercise   Days per week of moderate/strenous exercise 2 days   Average minutes spent exercising at this level 40 min      (!) EXERCISE CONCERN      12/3/2024   Social Factors   Frequency of gathering with friends or relatives Once a week   Worry food won't last until get money to buy more No   Food not last or not have enough money for food? No   Do you have housing? (Housing is defined as stable permanent housing and does not include staying ouside in a car, in a tent, in an abandoned building, in an overnight shelter, or couch-surfing.) Yes   Are you worried about losing your housing? No   Lack of transportation? No   Unable to get utilities (heat,electricity)? No            12/3/2024   Fall Risk   Fallen 2 or more times in the past year? No    Trouble with walking or balance? No        Patient-reported          12/3/2024   Dental   Dentist two times every year? Yes            12/3/2024   TB Screening   Were you born outside of the [place]? No          Today's PHQ-9 Score:       12/3/2024     6:40 AM   PHQ-9 SCORE   PHQ-9 Total Score MyChart 0   PHQ-9 Total Score 0        Patient-reported         12/3/2024   Substance Use   Alcohol more than 3/day or more than 7/wk No   Do you use any other substances recreationally? No        Social History     Tobacco Use    Smoking status: Never    Smokeless tobacco: Never    Tobacco comments:     no smokers in household   Vaping Use    Vaping status: Never Used   Substance Use Topics    Alcohol use: Yes     Comment: 1-2 per week    Drug use: No           11/21/2024   LAST FHS-7 RESULTS   1st degree relative breast or ovarian cancer Yes   Any relative bilateral breast cancer No   Any male have breast cancer No   Any ONE woman have BOTH breast AND ovarian cancer No   Any woman with breast cancer before 50yrs Yes   2 or more relatives with breast AND/OR ovarian cancer No   2 or more relatives with breast AND/OR bowel cancer No      Mammogram Screening - Mammogram every 1-2 years  updated in Health Maintenance based on mutual decision making        12/3/2024   STI Screening   New sexual partner(s) since last STI/HIV test? No        History of abnormal Pap smear: No - age 30- 64 PAP with HPV every 5 years recommended        Latest Ref Rng & Units 11/4/2021     9:59 AM 8/9/2016     4:45 PM 8/9/2016    12:00 AM   PAP / HPV   PAP  Negative for Intraepithelial Lesion or Malignancy (NILM)      PAP (Historical)    NIL    HPV 16 DNA Negative Negative  Negative     HPV 18 DNA Negative Negative  Negative     Other HR HPV Negative Negative  Negative       ASCVD Risk   The 10-year ASCVD risk score (Bethany DEMPSEY, et al., 2019) is: 1.7%    Values used to calculate the score:      Age: 59 years      Sex: Female      Is Non- : No      Diabetic: No      Tobacco smoker: No      Systolic Blood Pressure: 106 mmHg      Is BP treated: No      HDL Cholesterol: 98 mg/dL      Total Cholesterol: 184 mg/dL      Reviewed and updated as needed this visit by Provider   Tobacco  Allergies  Meds  Problems  Med Hx  Surg Hx  Fam Hx            Past Medical History:   Diagnosis Date    Psoriasis 09/23/2009     Past Surgical History:   Procedure Laterality Date    BIOPSY  06/01/2014    breast biopsy, right    COLONOSCOPY  07/21/2014    Procedure: COLONOSCOPY;  Surgeon: Duane, William Charles, MD;  Location: [facility] OR    COLONOSCOPY      COSMETIC SURGERY  1988    Birthmark removal - neck    EYE SURGERY  2019    Cataract Left eye    GYN SURGERY  1991, 1993    Laparoscopy - infertility related; D&C    HC TOOTH EXTRACTION W/FORCEP      HEAD & NECK SURGERY      ZZC NONSPECIFIC PROCEDURE      laparoscopy for infertility     Lab work is in process  Labs reviewed in EPIC  BP Readings from Last 3 Encounters:   12/03/24 106/66   09/12/24 112/62   09/11/24 115/70    Wt Readings from Last 3 Encounters:   12/03/24 54.9 kg (121 lb)   09/12/24 54.4 kg (120 lb)   09/11/24 53.1 kg (117 lb)                 "      Review of Systems  Constitutional, neuro, ENT, endocrine, pulmonary, cardiac, gastrointestinal, genitourinary, musculoskeletal, integument and psychiatric systems are negative, except as otherwise noted.     Objective    Exam  /66   Pulse 58   Temp 97.2  F (36.2  C) (Temporal)   Resp 14   Ht 1.6 m (5' 2.99\")   Wt 54.9 kg (121 lb)   LMP  (LMP Unknown)   SpO2 98%   BMI 21.44 kg/m     Estimated body mass index is 21.44 kg/m  as calculated from the following:    Height as of this encounter: 1.6 m (5' 2.99\").    Weight as of this encounter: 54.9 kg (121 lb).    Physical Exam  GENERAL: alert and no distress  EYES: Eyes grossly normal to inspection, PERRL and conjunctivae and sclerae normal  HENT: ear canals and TM's normal, nose and mouth without ulcers or lesions  NECK: no adenopathy, no asymmetry, masses, or scars  RESP: lungs clear to auscultation - no rales, rhonchi or wheezes  CV: regular rate and rhythm, normal S1 S2, no S3 or S4, no murmur, click or rub, no peripheral edema  ABDOMEN: soft, nontender, no hepatosplenomegaly, no masses and bowel sounds normal  MS: no gross musculoskeletal defects noted, no edema  SKIN: no suspicious lesions or rashes  NEURO: Normal strength and tone, mentation intact and speech normal  PSYCH: mentation appears normal, affect normal/bright  Declined breast exam   Pelvic not indicated     Diagnostics: reviewed in Epic, see orders pending in Epic     Signed Electronically by: Camila Sanchez PA-C      "

## 2025-01-02 ENCOUNTER — TELEPHONE (OUTPATIENT)
Dept: GASTROENTEROLOGY | Facility: CLINIC | Age: 60
End: 2025-01-02
Payer: COMMERCIAL

## 2025-01-02 NOTE — TELEPHONE ENCOUNTER
"Endoscopy Scheduling Screen    Have you had any respiratory illness or flu-like symptoms in the last 10 days?  No    What is your communication preference for Instructions and/or Bowel Prep?   MyChart    What insurance is in the chart?  Other:  [employer]    Ordering/Referring Provider: Camila Sanchez PA-C in  [facility]     (If ordering provider performs procedure, schedule with ordering provider unless otherwise instructed. )    BMI: Estimated body mass index is 21.44 kg/m  as calculated from the following:    Height as of 12/3/24: 1.6 m (5' 2.99\").    Weight as of 12/3/24: 54.9 kg (121 lb).     Sedation Ordered  moderate sedation.   If patient BMI > 50 do not schedule in [facility].    If patient BMI > 45 do not schedule at [facility].    Are you taking methadone or Suboxone?  NO, No RN review required.    Have you been diagnosed and are being treated for severe PTSD or severe anxiety?  NO, No RN review required.    Are you taking any prescription medications for pain 3 or more times per week?   NO, No RN review required.    Do you have a history of malignant hyperthermia?  No    (Females) Are you currently pregnant?   No     Have you been diagnosed or told you have pulmonary hypertension?   No    Do you have an LVAD?  No    Have you been told you have moderate to severe sleep apnea?  No.    Have you been told you have COPD, asthma, or any other lung disease?  No    Do you have any heart conditions?  No     Have you ever had or are you waiting for an organ transplant?  No. Continue scheduling, no site restrictions.    Have you had a stroke or transient ischemic attack (TIA aka \"mini stroke\" in the last 6 months?   No    Have you been diagnosed with or been told you have cirrhosis of the liver?   No.    Are you currently on dialysis?   No    Do you need assistance transferring?   No    BMI: Estimated body mass index is 21.44 kg/m  as calculated from the following:    Height as of 12/3/24: 1.6 m (5' 2.99\").    " Weight as of 12/3/24: 54.9 kg (121 lb).     Is patients BMI > 40 and scheduling location [facility]?  No    Do you take an injectable or oral medication for weight loss or diabetes (excluding insulin)?  No    Do you take the medication Naltrexone?  No    Do you take blood thinners?  No       Prep   Are you currently on dialysis or do you have chronic kidney disease?  No    Do you have a diagnosis of diabetes?  No    Do you have a diagnosis of cystic fibrosis (CF)?  No    On a regular basis do you go 3 -5 days between bowel movements?  No    BMI > 40?  No    Preferred Pharmacy:      [id number] - [place], [place] - [address]  [address] [address]  Phone: [phone] Fax: [phone]      Final Scheduling Details     Procedure scheduled  Colonoscopy    Surgeon:  TAZ     Date of procedure:  1/13     Pre-OP / PAC:   No - Not required for this site.    Location  [facility] - [facility] - Patient preference.    Sedation   Moderate Sedation - Per order.      Patient Reminders:   You will receive a call from a Nurse to review instructions and health history.  This assessment must be completed prior to your procedure.  Failure to complete the Nurse assessment may result in the procedure being cancelled.      On the day of your procedure, please designate an adult(s) who can drive you home stay with you for the next 24 hours. The medicines used in the exam will make you sleepy. You will not be able to drive.      You cannot take public transportation, ride share services, or non-medical taxi service without a responsible caregiver.  Medical transport services are allowed with the requirement that a responsible caregiver will receive you at your destination.  We require that drivers and caregivers are confirmed prior to your procedure.

## 2025-01-09 ENCOUNTER — TELEPHONE (OUTPATIENT)
Dept: GASTROENTEROLOGY | Facility: CLINIC | Age: 60
End: 2025-01-09
Payer: COMMERCIAL

## 2025-01-13 ENCOUNTER — HOSPITAL ENCOUNTER (OUTPATIENT)
Facility: AMBULATORY SURGERY CENTER | Age: 60
Discharge: HOME | End: 2025-01-13
Attending: STUDENT IN AN ORGANIZED HEALTH CARE EDUCATION/TRAINING PROGRAM | Admitting: STUDENT IN AN ORGANIZED HEALTH CARE EDUCATION/TRAINING PROGRAM
Payer: COMMERCIAL

## 2025-01-13 VITALS
OXYGEN SATURATION: 98 % | TEMPERATURE: 97.7 F | DIASTOLIC BLOOD PRESSURE: 56 MMHG | HEART RATE: 63 BPM | RESPIRATION RATE: 16 BRPM | SYSTOLIC BLOOD PRESSURE: 99 MMHG

## 2025-01-13 LAB — COLONOSCOPY: NORMAL

## 2025-01-13 PROCEDURE — G8918 PT W/O PREOP ORDER IV AB PRO: HCPCS

## 2025-01-13 PROCEDURE — 88305 TISSUE EXAM BY PATHOLOGIST: CPT | Performed by: PATHOLOGY

## 2025-01-13 PROCEDURE — 45385 COLONOSCOPY W/LESION REMOVAL: CPT

## 2025-01-13 PROCEDURE — G8907 PT DOC NO EVENTS ON DISCHARG: HCPCS

## 2025-01-13 RX ORDER — ATROPINE SULFATE 0.1 MG/ML
1 INJECTION INTRAVENOUS
Status: DISCONTINUED | OUTPATIENT
Start: 2025-01-13 | End: 2025-01-14 | Stop reason: HOSPADM

## 2025-01-13 RX ORDER — FLUMAZENIL 0.1 MG/ML
0.2 INJECTION, SOLUTION INTRAVENOUS
Status: DISCONTINUED | OUTPATIENT
Start: 2025-01-13 | End: 2025-01-14 | Stop reason: HOSPADM

## 2025-01-13 RX ORDER — SIMETHICONE 40MG/0.6ML
133 SUSPENSION, DROPS(FINAL DOSAGE FORM)(ML) ORAL
Status: DISCONTINUED | OUTPATIENT
Start: 2025-01-13 | End: 2025-01-14 | Stop reason: HOSPADM

## 2025-01-13 RX ORDER — NALOXONE HYDROCHLORIDE 0.4 MG/ML
0.2 INJECTION, SOLUTION INTRAMUSCULAR; INTRAVENOUS; SUBCUTANEOUS
Status: DISCONTINUED | OUTPATIENT
Start: 2025-01-13 | End: 2025-01-14 | Stop reason: HOSPADM

## 2025-01-13 RX ORDER — NALOXONE HYDROCHLORIDE 0.4 MG/ML
0.4 INJECTION, SOLUTION INTRAMUSCULAR; INTRAVENOUS; SUBCUTANEOUS
Status: DISCONTINUED | OUTPATIENT
Start: 2025-01-13 | End: 2025-01-14 | Stop reason: HOSPADM

## 2025-01-13 RX ORDER — FENTANYL CITRATE 50 UG/ML
50-100 INJECTION, SOLUTION INTRAMUSCULAR; INTRAVENOUS EVERY 5 MIN PRN
Status: DISCONTINUED | OUTPATIENT
Start: 2025-01-13 | End: 2025-01-14 | Stop reason: HOSPADM

## 2025-01-13 RX ORDER — LIDOCAINE 40 MG/G
CREAM TOPICAL
Status: DISCONTINUED | OUTPATIENT
Start: 2025-01-13 | End: 2025-01-14 | Stop reason: HOSPADM

## 2025-01-13 RX ORDER — EPINEPHRINE 1 MG/ML
0.1 INJECTION, SOLUTION INTRAMUSCULAR; SUBCUTANEOUS
Status: DISCONTINUED | OUTPATIENT
Start: 2025-01-13 | End: 2025-01-14 | Stop reason: HOSPADM

## 2025-01-13 RX ORDER — DIPHENHYDRAMINE HYDROCHLORIDE 50 MG/ML
25-50 INJECTION INTRAMUSCULAR; INTRAVENOUS
Status: DISCONTINUED | OUTPATIENT
Start: 2025-01-13 | End: 2025-01-14 | Stop reason: HOSPADM

## 2025-01-13 NOTE — H&P
Pre-Endoscopy History and Physical     Brittany Corbett MRN# [id number]   YOB: 1965 Age: 59 year old     Date of Procedure: 01/13/25  Primary care provider: Camila Sanchez  Type of Endoscopy: Colonoscopy  Reason for Procedure: Screening  Type of Anesthesia Anticipated: Moderate Sedation    HPI:    Brittany is a 59 year old female who will be undergoing the above procedure.      Prior colonoscopy: 10 years ago    A history and physical has been performed, notable for none. The patient's medications and allergies have been reviewed. The risks and benefits of the procedure and the sedation options and risks were discussed with the patient.  All questions were answered and informed consent was obtained.      Symptoms:  Rectal bleeding: No  Irregular bowel habits: No  Abnormal weight loss: No  Changes in stool: No    She denies a personal or family history of anesthesia complications or bleeding disorders. Denies family history of CRC or IBD.    Allergies   Allergen Reactions    No Known Drug Allergy       Allergy to Latex: NO   Allergy to tape: NO   Intolerances: NO     Current Outpatient Medications   Medication Sig Dispense Refill    tolterodine (DETROL) 2 MG tablet TAKE ONE TABLET BY MOUTH TWICE A  tablet 3    bisacodyl (DULCOLAX) 5 MG EC tablet Two days prior to exam take two (2) tablets at 4pm. One day prior to exam take two (2) tablets at 4pm 4 tablet 0    clobetasol propionate (TEMOVATE) 0.05 % external cream APPLY TOPICALLY TO THE AFFECTED RASH AREA(S) TWICE DAILY AS NEEDED AS DIRECTED 60 g 3    fluocinonide (LIDEX) 0.05 % external solution APPLY TO THE SCALP ONCE DAILY 60 mL 11    fluticasone (FLONASE) 50 MCG/ACT nasal spray Spray 2 sprays into both nostrils daily 1 Package 12    polyethylene glycol (GOLYTELY) 236 g suspension Two days before procedure at 5PM fill first container with water. Mix and drink an 8 oz glass every 15 minutes until HALF of the container is gone.  Place the remainder in the refrigerator. One day before procedure at 5PM drink second half of bowel prep. Drink an 8 oz glass every 15 minutes until it is gone. Day of procedure 6 hours before arrival time fill the 2nd container with water. Mix and drink an 8 oz glass every 15 minutes until HALF of the container is gone. Discard the remaining solution. 8000 mL 0     Current Facility-Administered Medications   Medication Dose Route Frequency Provider Last Rate Last Admin    atropine injection 1 mg  1 mg Intravenous Once PRMercedez Blue MD        benzocaine 20% (HURRICAINE/TOPEX) 20 % spray 0.5 mL  1 spray Mouth/Throat Once PRN Mercedez Regan MD        diphenhydrAMINE (BENADRYL) injection 25-50 mg  25-50 mg Intravenous Once PRMercedez Blue MD        EPINEPHrine (Anaphylaxis) (ADRENALIN) injection (vial) 0.1 mg  0.1 mg Submucosal Once PRMercedez Blue MD        fentaNYL (PF) (SUBLIMAZE) injection  mcg   mcg Intravenous Q5 Min PRN Mercedez Regan MD        flumazenil (ROMAZICON) injection 0.2 mg  0.2 mg Intravenous q1 min prMercedez Blue MD        glucagon injection 0.5 mg  0.5 mg Intravenous Once PRMercedez Blue MD        lidocaine (LMX4) kit   Topical Q1H PRN Mercedez Regan MD        lidocaine 1 % 0.1-1 mL  0.1-1 mL Other Q1H PRMercedez Blue MD        midazolam (VERSED) injection 0.5-2 mg  0.5-2 mg Intravenous Q4 Min PRMercedez Blue MD        naloxone (NARCAN) injection 0.2 mg  0.2 mg Intravenous Q2 Min PRMercedez Blue MD        Or    naloxone (NARCAN) injection 0.4 mg  0.4 mg Intravenous Q2 Min PRMercedez Blue MD        Or    naloxone (NARCAN) injection 0.2 mg  0.2 mg Intramuscular Q2 Min PRMercedez Blue MD        Or    naloxone (NARCAN) injection 0.4 mg  0.4 mg Intramuscular Q2 Min PRN Mercedez Regan MD        simethicone (MYLICON) suspension 133 mg  133 mg Oral Once PRN  Mercedez Regan MD        sodium chloride (PF) 0.9% PF flush 3 mL  3 mL Intracatheter Q8H Mercedez Regan MD        sodium chloride (PF) 0.9% PF flush 3 mL  3 mL Intracatheter q1 min prn Mercedez Regan MD        sodium chloride (PF) 0.9% PF flush 3 mL  3 mL Intravenous q1 min prn Mercedez Regan MD        sodium chloride 0.9% BOLUS 500 mL  500 mL Intravenous Once PRN Mercedez Regan MD           Patient Active Problem List   Diagnosis    Other chronic allergic conjunctivitis    Psoriasis    Bulging discs    Overactive bladder    Perineural cyst    Chronic left-sided low back pain without sciatica    Lumbar radiculopathy    Herniated lumbar disc without myelopathy        Past Medical History:   Diagnosis Date    Psoriasis 09/23/2009        Past Surgical History:   Procedure Laterality Date    BIOPSY  06/01/2014    breast biopsy, right    COLONOSCOPY  07/21/2014    Procedure: COLONOSCOPY;  Surgeon: Duane, William Charles, MD;  Location: [facility] OR    COLONOSCOPY      COSMETIC SURGERY  1988    Birthmark removal - neck    EYE SURGERY  2019    Cataract Left eye    GYN SURGERY  1991, 1993    Laparoscopy - infertility related; D&C    HC TOOTH EXTRACTION W/FORCEP      HEAD & NECK SURGERY      ZZC NONSPECIFIC PROCEDURE      laparoscopy for infertility       Social History     Tobacco Use    Smoking status: Never    Smokeless tobacco: Never    Tobacco comments:     no smokers in household   Substance Use Topics    Alcohol use: Yes     Comment: 1-2 per week       Family History   Problem Relation Age of Onset    Diabetes Mother         type 2    Cancer Mother         multiple myeloma    Hypertension Mother     Other Cancer Mother         Multiple Myeloma    C.A.D. Father         age 55 for CABG, had second bypass as well    Lipids Father     Depression Father     Cardiovascular Father     Coronary Artery Disease Father     Hyperlipidemia Father     Cancer Paternal Grandmother         cervical     "Neurologic Disorder Sister         MS    Breast Cancer Sister         11/2012    Breast Cancer Maternal Aunt     Breast Cancer Other         Cousin    Breast Cancer Cousin     Breast Cancer Sister     Anxiety Disorder Daughter     Thyroid Disease Daughter        REVIEW OF SYSTEMS:     5 point ROS negative except as noted above in HPI, including Gen., Resp., CV, GI &  system review.      PHYSICAL EXAM:   /63   Temp 97.7  F (36.5  C) (Temporal)   Resp 18   LMP  (LMP Unknown)   SpO2 99%  Estimated body mass index is 21.44 kg/m  as calculated from the following:    Height as of 12/3/24: 1.6 m (5' 2.99\").    Weight as of 12/3/24: 54.9 kg (121 lb).   All Vitals have been reviewed.    GENERAL APPEARANCE: healthy and alert  MENTAL STATUS: alert  AIRWAY EXAM: Mallampatti Class II (visualization of the soft palate, fauces, and uvula)  RESP: lungs clear to auscultation - no rales, rhonchi or wheezes  CV: regular rates and rhythm      IMPRESSION   ASA Class 1 - Healthy patient, no medical problems        PLAN:     Plan for colonoscopy. We discussed the risks, benefits and alternatives and the patient wished to proceed.    The above has been forwarded to the consulting provider.      TIRSO MCGHEE MD  [facility]  Phone: [phone]  Fax: [phone]  January 13, 2025    "

## 2025-01-14 LAB
PATH REPORT.COMMENTS IMP SPEC: NORMAL
PATH REPORT.COMMENTS IMP SPEC: NORMAL
PATH REPORT.FINAL DX SPEC: NORMAL
PATH REPORT.GROSS SPEC: NORMAL
PATH REPORT.MICROSCOPIC SPEC OTHER STN: NORMAL
PATH REPORT.RELEVANT HX SPEC: NORMAL
PHOTO IMAGE: NORMAL

## 2025-01-27 PROBLEM — D12.6 COLON ADENOMA: Status: ACTIVE | Noted: 2025-01-27

## 2025-01-28 ENCOUNTER — PATIENT OUTREACH (OUTPATIENT)
Dept: GASTROENTEROLOGY | Facility: CLINIC | Age: 60
End: 2025-01-28
Payer: COMMERCIAL

## (undated) DEVICE — KIT ENDO FIRST STEP DISINFECTANT 200ML W/POUCH EP-4

## (undated) DEVICE — PAD CHUX UNDERPAD 23X24" 7136

## (undated) DEVICE — PREP CHLORAPREP 26ML TINTED ORANGE  260815

## (undated) DEVICE — CLIP HEMOSTASIS ASSURANCE W18 MM 00711885

## (undated) RX ORDER — FENTANYL CITRATE 50 UG/ML
INJECTION, SOLUTION INTRAMUSCULAR; INTRAVENOUS
Status: DISPENSED
Start: 2025-01-13